# Patient Record
Sex: FEMALE | Race: WHITE | Employment: OTHER | URBAN - METROPOLITAN AREA
[De-identification: names, ages, dates, MRNs, and addresses within clinical notes are randomized per-mention and may not be internally consistent; named-entity substitution may affect disease eponyms.]

---

## 2018-09-06 ENCOUNTER — HOSPITAL ENCOUNTER (OUTPATIENT)
Dept: PHYSICAL THERAPY | Age: 78
Discharge: HOME | End: 2018-09-06
Payer: MEDICARE

## 2018-09-06 PROCEDURE — 97162 PT EVAL MOD COMPLEX 30 MIN: CPT | Performed by: PHYSICAL THERAPIST

## 2018-09-06 PROCEDURE — G8985 CARRY GOAL STATUS: HCPCS | Performed by: PHYSICAL THERAPIST

## 2018-09-06 PROCEDURE — G8984 CARRY CURRENT STATUS: HCPCS | Performed by: PHYSICAL THERAPIST

## 2018-09-06 PROCEDURE — 97110 THERAPEUTIC EXERCISES: CPT | Performed by: PHYSICAL THERAPIST

## 2018-09-06 NOTE — PROGRESS NOTES
PHYSICAL THERAPY - DAILY TREATMENT NOTE Patient Name: Jesus Bee        Date: 2018 : 1940   YES Patient  Verified Visit #:     Insurance: Payor: [place] MEDICARE / Plan: VA MEDICARE PART A & B / Product Type: Medicare / In time: 1:40 Out time: 2:30 Total Treatment Time: 50 Medicare Time Tracking (below) Total Timed Codes (min):  10 1:1 Treatment Time:  10 TREATMENT AREA = H/O shoulder surgery [Z98.890] SUBJECTIVE Pain Level (on 0 to 10 scale):  0  / 10 Medication Changes/New allergies or changes in medical history, any new surgeries or procedures? NO    If yes, update Summary List  
Subjective Functional Status/Changes:  []  No changes reported See eval/POC OBJECTIVE Modalities Rationale:     decrease edema, decrease inflammation and decrease pain to improve patient's ability to prevent soreness 
 min [] Estim, type/location:    
                                 []  att     []  unatt     []  w/US     []  w/ice    []  w/heat  
 min []  Mechanical Traction: type/lbs   
               []  pro   []  sup   []  int   []  cont    []  before manual    []  after manual  
 min []  Ultrasound, settings/location:    
 min []  Iontophoresis w/ dexamethasone, location:   
                                           []  take home patch       []  in clinic  
10 min [x]  Ice     []  Heat    location/position: R shoulder - seated after treatment  
 min []  Vasopneumatic Device, press/temp:   
 min []  Other:   
[] Skin assessment post-treatment (if applicable):   
[]  intact    []  redness- no adverse reaction    
[]redness  adverse reaction:     
 
10 min Therapeutic Exercise:  [x]  See flow sheet Rationale:      increase ROM, increase strength and improve coordination to improve the patients ability to regain functional reach  
 
 min Therapeutic Activity:   
 
 
 min Neuromuscular Re-ed:   
 
 
 min Gait Training:   
 
 min Patient Education:  YES  Reviewed HEP  
 []  Progressed/Changed HEP based on: Other Objective/Functional Measures: FOTO - 47 Post Treatment Pain Level (on 0 to 10) scale:   0  / 10 ASSESSMENT Assessment/Changes in Function:  
 
Pt has ROM loss, weakness, and limited functional use s/p reverse TSA. []  See Progress Note/Recertification Patient will continue to benefit from skilled PT services to modify and progress therapeutic interventions, address functional mobility deficits, address ROM deficits, address strength deficits, analyze and address soft tissue restrictions, analyze and cue movement patterns, analyze and modify body mechanics/ergonomics and assess and modify postural abnormalities to attain remaining goals. Progress toward goals / Updated goals: 
 
Goals established today PLAN [x]  Upgrade activities as tolerated YES Continue plan of care  
[]  Discharge due to :   
[]  Other:   
 
Therapist: Belem Leggett PT Date: 9/6/2018 Time: 1:35 PM  
 
Future Appointments Date Time Provider Stacey Hernández 9/11/2018 1:00 PM Belem Leggett PT [facility]  
9/14/2018 1:00 PM Belem Leggett PT [facility]  
9/17/2018 2:30 PM Belem Leggett, PT [facility]  
9/19/2018 2:00 PM Belem Leggett, PT [facility]  
9/21/2018 1:00 PM Belem Leggett, PT [facility]  
9/24/2018 3:00 PM Belem Leggett PT [facility]  
9/26/2018 2:00 PM Belem Leggett PT [facility]  
9/28/2018 2:00 PM Ayaan [facility]  
10/1/2018 2:00 PM Belem Leggett, PT [facility]  
10/3/2018 2:00 PM Belem Leggett, PT [facility]  
10/5/2018 2:00 PM [facility]

## 2018-09-06 NOTE — PROGRESS NOTES
Tori [address]  [facility] [facility]  [facility] 
[address] ,[address], [address]  Phone: [phone]  Fax: [phone] PLAN OF CARE / STATEMENT OF MEDICAL NECESSITY FOR PHYSICAL THERAPY SERVICES Patient Name: Roe Rios : 1940 Medical  
Diagnosis: H/O shoulder surgery [Z98.890] Treatment Diagnosis: R Shoulder OA Onset Date: 18 Referral Source: Isiah Alexandra MD [facility]): 2018 Prior Hospitalization: See medical history Provider #: [id number] Prior Level of Functioning Limited due of R UE since falling, then tearing her RC in 2018 Comorbidities: H/o prior RSA ~10 yrs ago, B MAGALI, B TKA, polio, thyroid dysfunction, h/o breast cancer Medications: Verified on Patient Summary List  
The Plan of Care and following information is based on the information from the initial evaluation.  
=========================================================================================== Assessment / key information:  78 y/o female presents to PT s/p reverse TSA on 18. She reports a fall last year and subsequent tear of RC when reaching in her car. Pt had severe pain and loss of motion for several months prior to surgery. She wore a sling for 3 weeks, then came out of the sling a week ago. She has been afraid to use her R UE, but does brush her teeth and eat with R UE.  AROM assessment reveals limited ER (0 degrees with arm by side), and elevation to 95 degrees. Pt was educated and could verbally recall precautions to avoid reaching into IR and across her body. She would benefit from PT to increase AA->AROM of right UE to maxmize functional return/use of R (dominant) UE. 
=========================================================================================== Eval Complexity: History HIGH Complexity :3+ comorbidities / personal factors will impact the outcome/ POC ;  Examination  MEDIUM Complexity : 3 Standardized tests and measures addressing body structure, function, activity limitation and / or participation in recreation ; Presentation MEDIUM Complexity : Evolving with changing characteristics ; Decision Making MEDIUM Complexity : FOTO score of 26-74; Overall Complexity MEDIUM Problem List: pain affecting function, decrease ROM, decrease strength, decrease ADL/ functional abilitiies, decrease activity tolerance and decrease flexibility/ joint mobility Treatment Plan may include any combination of the following: Therapeutic exercise, Therapeutic activities, Neuromuscular re-education, Physical agent/modality, Gait/balance training, Manual therapy, Dry Needling, Aquatic therapy, Patient education, Self Care training, Functional mobility training, Home safety training and Stair training Patient / Family readiness to learn indicated by: asking questions, trying to perform skills and interestPersons(s) to be included in education: patient (P) Barriers to Learning/Limitations: None Measures taken:   
Patient Goal (s): \"better motion and strength\" Patient self reported health status: fair Rehabilitation Potential: good? Short Term Goals: To be accomplished in  2  weeks: 1. Pt independent with HEP for AA-AROM into elevation. 2. Pt to report increased use of right UE for light ADLs without pain. ? Long Term Goals: To be accomplished in  6  weeks: 1. Pt to increase FOTO score from 54 to >/= 67. 
2. Pt independent with high level HEP for right shoddler AROM/gentle strengthening. 3. Pt to report ability to use right UE for feeding her dependent  without symptoms. 4. Pt able to reach actively to 120 degrees to allow use of right in kitchen without symptoms. Frequency / Duration:   Patient to be seen  2-3  times per week for 6  weeks: 
Patient / Caregiver education and instruction: self care, activity modification and exercises G-Codes (GP): Carry [id number] Current  CK= 40-59%  Goal  CJ= 20-39%. The severity rating is based on the FOTO Score Therapist Signature: Marta Rojas PT Date: 9/6/2018 Certification Period: 9/6/18 - 12/6/18 Time: 1:36 PM  
=========================================================================================== I certify that the above Physical Therapy Services are being furnished while the patient is under my care. I agree with the treatment plan and certify that this therapy is necessary. Physician Signature:       Date:      Time:  Please sign and return to [facility] or you may fax the signed copy to [phone]. Thank you.

## 2018-09-11 ENCOUNTER — HOSPITAL ENCOUNTER (OUTPATIENT)
Dept: PHYSICAL THERAPY | Age: 78
Discharge: HOME | End: 2018-09-11
Payer: MEDICARE

## 2018-09-11 PROCEDURE — 97110 THERAPEUTIC EXERCISES: CPT | Performed by: PHYSICAL THERAPIST

## 2018-09-11 NOTE — PROGRESS NOTES
PHYSICAL THERAPY - DAILY TREATMENT NOTE Patient Name: Lopez Shannon        Date: 2018 : 1940   YES Patient  Verified Visit #:     Insurance: Payor: VA MEDICARE / Plan: VA MEDICARE PART A & B / Product Type: Medicare / In time: 1:00 Out time: 1:45 Total Treatment Time: 40 Medicare Time Tracking (below) Total Timed Codes (min):  30 1:1 Treatment Time:  30 TREATMENT AREA = H/O shoulder surgery [Z98.890] SUBJECTIVE Pain Level (on 0 to 10 scale):  0  / 10 Medication Changes/New allergies or changes in medical history, any new surgeries or procedures? NO    If yes, update Summary List  
Subjective Functional Status/Changes:  []  No changes reported Pt denies having any soreness after initial sesison. OBJECTIVE Modalities Rationale:     decrease edema, decrease inflammation and decrease pain to improve patient's ability to prevent soreness 
 min [] Estim, type/location:    
                                 []  att     []  unatt     []  w/US     []  w/ice    []  w/heat  
 min []  Mechanical Traction: type/lbs   
               []  pro   []  sup   []  int   []  cont    []  before manual    []  after manual  
 min []  Ultrasound, settings/location:    
 min []  Iontophoresis w/ dexamethasone, location:   
                                           []  take home patch       []  in clinic  
10 min [x]  Ice     []  Heat    location/position: R shoulder - seated after treatment  
 min []  Vasopneumatic Device, press/temp:   
 min []  Other:   
[] Skin assessment post-treatment (if applicable):   
[]  intact    []  redness- no adverse reaction    
[]redness  adverse reaction:     
 
30 min Therapeutic Exercise:  [x]  See flow sheet Rationale:      increase ROM, increase strength and improve coordination to improve the patients ability to regain functional reach  
 
 
 min Therapeutic Activity:   
 
 
 min Neuromuscular Re-ed:   
 
 
 min Gait Training: min Patient Education:  YES  Reviewed HEP []  Progressed/Changed HEP based on: Other Objective/Functional Measures: Added ball rolling to 90 degrees, then ER -> lift off beyond 90 Post Treatment Pain Level (on 0 to 10) scale:   0  / 10 ASSESSMENT Assessment/Changes in Function:  
 
Pt showed improved movement patterns ans exercise tolerance to day without pain. She did use compensatory scapular elevation when she started to fatigue. []  See Progress Note/Recertification Patient will continue to benefit from skilled PT services to modify and progress therapeutic interventions, address functional mobility deficits, address ROM deficits, address strength deficits, analyze and address soft tissue restrictions, analyze and cue movement patterns, analyze and modify body mechanics/ergonomics and assess and modify postural abnormalities to attain remaining goals. Progress toward goals / Updated goals: Will continue to progress therex per protocol as tolerated. PLAN [x]  Upgrade activities as tolerated YES Continue plan of care  
[]  Discharge due to :   
[]  Other:   
 
Therapist: Alfonso Porter PT Date: 9/11/2018 Time: 9:11 AM  
 
Future Appointments Date Time Provider Stacey Hernández 9/11/2018 1:00 PM Alfonso Porter [place] [facility]  
9/14/2018 1:00 PM Alfonso Porter [place] [facility]  
9/17/2018 2:30 PM Alfonso Porter PT [facility]  
9/19/2018 2:00 PM Alfonso Porter PT [facility]  
9/21/2018 1:00 PM Alfonso Porter PT [facility]  
9/24/2018 3:00 PM Alfonso Porter PT [facility]  
9/26/2018 2:00 PM Alfonso Porter PT [facility]  
9/28/2018 2:00 PM Eva Johnson [facility]  
10/1/2018 2:00 PM Alfonso Porter PT [facility]  
10/3/2018 2:00 PM Alfonso Porter PT [facility]  
10/5/2018 2:00 PM Eva Johnson [facility]

## 2018-09-14 ENCOUNTER — APPOINTMENT (OUTPATIENT)
Dept: PHYSICAL THERAPY | Age: 78
End: 2018-09-14
Payer: MEDICARE

## 2018-09-17 ENCOUNTER — HOSPITAL ENCOUNTER (OUTPATIENT)
Dept: PHYSICAL THERAPY | Age: 78
Discharge: HOME | End: 2018-09-17
Payer: MEDICARE

## 2018-09-17 PROCEDURE — 97110 THERAPEUTIC EXERCISES: CPT | Performed by: PHYSICAL THERAPIST

## 2018-09-17 NOTE — PROGRESS NOTES
PHYSICAL THERAPY - DAILY TREATMENT NOTE Patient Name: Kristi Pisano        Date: 2018 : 1940   YES Patient  Verified Visit #:   3   of   18  Insurance: Payor: VA MEDICARE / Plan: VA MEDICARE PART A & B / Product Type: Medicare / In time: 2:30 Out time: 3:10 Total Treatment Time: 40 Medicare Time Tracking (below) Total Timed Codes (min):  25 1:1 Treatment Time:  25 TREATMENT AREA = H/O shoulder surgery [Z98.890] SUBJECTIVE Pain Level (on 0 to 10 scale):  0  / 10 Medication Changes/New allergies or changes in medical history, any new surgeries or procedures? NO    If yes, update Summary List  
Subjective Functional Status/Changes:  []  No changes reported Pt reports using right UE for ADLs, just following doctor instructions to avoid reaching behind back. OBJECTIVE Modalities Rationale:     decrease edema, decrease inflammation and decrease pain to improve patient's ability to prevent soreness 
 min [] Estim, type/location:    
                                 []  att     []  unatt     []  w/US     []  w/ice    []  w/heat  
 min []  Mechanical Traction: type/lbs   
               []  pro   []  sup   []  int   []  cont    []  before manual    []  after manual  
 min []  Ultrasound, settings/location:    
 min []  Iontophoresis w/ dexamethasone, location:   
                                           []  take home patch       []  in clinic  
10 min [x]  Ice     []  Heat    location/position: R shoulder - seated after treatment  
 min []  Vasopneumatic Device, press/temp:   
 min []  Other:   
[] Skin assessment post-treatment (if applicable):   
[]  intact    []  redness- no adverse reaction    
[]redness  adverse reaction:     
 
30(25) min Therapeutic Exercise:  [x]  See flow sheet Rationale:      increase ROM, increase strength and improve coordination to improve the patients ability to regain functional reach w R UE  
 
 
 min Therapeutic Activity: min Neuromuscular Re-ed:   
 
 
 min Gait Training:   
 
 min Patient Education:  Mauri Alba []  Progressed/Changed HEP based on: Other Objective/Functional Measures: Added T-band row, bicep curls and active elevation with straight arms against gravity Post Treatment Pain Level (on 0 to 10) scale:   0  / 10 ASSESSMENT Assessment/Changes in Function:  
 
Pt fatigued quickly with addition of new exercises. []  See Progress Note/Recertification Patient will continue to benefit from skilled PT services to modify and progress therapeutic interventions, address functional mobility deficits, address ROM deficits, address strength deficits, analyze and address soft tissue restrictions, analyze and cue movement patterns, analyze and modify body mechanics/ergonomics and assess and modify postural abnormalities to attain remaining goals. Progress toward goals / Updated goals: Will continue with gradual progression of right shoulder strength/endurance per protocol. PLAN [x]  Upgrade activities as tolerated YES Continue plan of care  
[]  Discharge due to :   
[]  Other:   
 
Therapist: Rush Muñoz PT Date: 9/17/2018 Time: 10:34 AM  
 
Future Appointments Date Time Provider Stacey Hernández 9/17/2018 2:30 PM Brena Lung, PT [facility]  
9/19/2018 2:00 PM Brena Lung, PT [facility]  
9/21/2018 1:00 PM Brena Lung, PT [facility]  
9/24/2018 3:00 PM Brena Lung, PT [facility]  
9/26/2018 2:00 PM Brena Lung, PT [facility]  
9/28/2018 2:00 PM Sameera December [facility]  
10/1/2018 2:00 PM Brena Lung, PT [facility]  
10/3/2018 2:00 PM Brena Lung, PT [facility]  
10/5/2018 2:00 PM Sameera [facility]

## 2018-09-19 ENCOUNTER — HOSPITAL ENCOUNTER (OUTPATIENT)
Dept: PHYSICAL THERAPY | Age: 78
Discharge: HOME | End: 2018-09-19
Payer: MEDICARE

## 2018-09-19 PROCEDURE — 97110 THERAPEUTIC EXERCISES: CPT | Performed by: PHYSICAL THERAPIST

## 2018-09-19 NOTE — PROGRESS NOTES
PHYSICAL THERAPY - DAILY TREATMENT NOTE Patient Name: Jesus Bee        Date: 2018 : 1940   YES Patient  Verified Visit #:     Insurance: Payor: Monty Inman / Plan: VA MEDICARE PART A & B / Product Type: Medicare / In time: 1:50 Out time: 2:45 Total Treatment Time: 54 Medicare Time Tracking (below) Total Timed Codes (min):  45 1:1 Treatment Time:  45 TREATMENT AREA = H/O shoulder surgery [Z98.890] SUBJECTIVE Pain Level (on 0 to 10 scale):  0  / 10 Medication Changes/New allergies or changes in medical history, any new surgeries or procedures? NO    If yes, update Summary List  
Subjective Functional Status/Changes:  []  No changes reported Pt reports minimal symptoms into shoulder. She has been using it for light tasks below shoulder level. No pain/soreness after last PT session. OBJECTIVE Modalities Rationale:     decrease edema, decrease inflammation and decrease pain to improve patient's ability to prevent soreness 
 min [] Estim, type/location:    
                                 []  att     []  unatt     []  w/US     []  w/ice    []  w/heat  
 min []  Mechanical Traction: type/lbs   
               []  pro   []  sup   []  int   []  cont    []  before manual    []  after manual  
 min []  Ultrasound, settings/location:    
 min []  Iontophoresis w/ dexamethasone, location:   
                                           []  take home patch       []  in clinic  
10 min [x]  Ice     []  Heat    location/position: R shoulder - seated after treatment  
 min []  Vasopneumatic Device, press/temp:   
 min []  Other:   
[] Skin assessment post-treatment (if applicable):   
[]  intact    []  redness- no adverse reaction    
[]redness  adverse reaction:     
 
45 min Therapeutic Exercise:  [x]  See flow sheet Rationale:      increase ROM, increase strength and improve coordination to improve the patients ability to regain functional reach min Therapeutic Activity:   
 
 
 min Neuromuscular Re-ed:   
 
 
 min Gait Training:   
 
 min Patient Education:  YES  Reviewed HEP []  Progressed/Changed HEP based on: Other Objective/Functional Measures: 
 
Able to reach to level 21 on wall ladder by last rep Had pt perform 2nd set of AAROM with pulleys in standing to allow straight elbow and minimize effects of ER/IR Post Treatment Pain Level (on 0 to 10) scale:   0  / 10 ASSESSMENT Assessment/Changes in Function:  
 
Added supine scapular protraction and supine right shoulder arm circles clockwise/counter-clockwise 2 sets of 30s. Pt has less fatigue by end of today's session. []  See Progress Note/Recertification Patient will continue to benefit from skilled PT services to modify and progress therapeutic interventions, address functional mobility deficits, address ROM deficits, address strength deficits, analyze and address soft tissue restrictions, analyze and cue movement patterns, analyze and modify body mechanics/ergonomics and assess and modify postural abnormalities to attain remaining goals. Progress toward goals / Updated goals: Will continue to progress therex to increase right shoulder strength/endurance PLAN [x]  Upgrade activities as tolerated YES Continue plan of care  
[]  Discharge due to :   
[]  Other:   
 
Therapist: Kylie Wells PT Date: 9/19/2018 Time: 10:49 AM  
 
Future Appointments Date Time Provider Stacey Hernández 9/19/2018 2:00 PM Kylie Wells [facility]  
9/21/2018 9:30 AM Kylie Wells [facility]  
9/24/2018 3:00 PM Kylie Wells PT [facility]  
9/26/2018 2:00 PM Kylie Wells PT [facility]  
9/28/2018 2:00 PM Lorena Ped [facility]  
10/1/2018 2:00 PM Kylie Wells PT [facility]  
10/3/2018 2:00 PM Kylie Wells PT [facility]  
10/5/2018 2:00 PM Lorena Ped [facility]

## 2018-09-21 ENCOUNTER — HOSPITAL ENCOUNTER (OUTPATIENT)
Dept: PHYSICAL THERAPY | Age: 78
Discharge: HOME | End: 2018-09-21
Payer: MEDICARE

## 2018-09-21 PROCEDURE — 97110 THERAPEUTIC EXERCISES: CPT | Performed by: PHYSICAL THERAPIST

## 2018-09-21 NOTE — PROGRESS NOTES
PHYSICAL THERAPY - DAILY TREATMENT NOTE Patient Name: Arianna Shah        Date: 2018 : 1940   YES Patient  Verified Visit #:      18  Insurance: Payor: Paula Cantor / Plan: Kelvin Alvarenga / Product Type: Medicare / In time: 9:30 Out time: 10:25 Total Treatment Time: 54 Medicare Time Tracking (below) Total Timed Codes (min):  40 1:1 Treatment Time:  40 TREATMENT AREA = H/O shoulder surgery [Z98.890] SUBJECTIVE Pain Level (on 0 to 10 scale):  0  / 10 Medication Changes/New allergies or changes in medical history, any new surgeries or procedures? NO    If yes, update Summary List  
Subjective Functional Status/Changes:  []  No changes reported Pt reports fatigue after PT only lasts a short period, then resolves. No residual soreness. OBJECTIVE Modalities Rationale:     decrease edema, decrease inflammation and decrease pain to improve patient's ability to prevent soreness 
 min [] Estim, type/location:    
                                 []  att     []  unatt     []  w/US     []  w/ice    []  w/heat  
 min []  Mechanical Traction: type/lbs   
               []  pro   []  sup   []  int   []  cont    []  before manual    []  after manual  
 min []  Ultrasound, settings/location:    
 min []  Iontophoresis w/ dexamethasone, location:   
                                           []  take home patch       []  in clinic  
10 min [x]  Ice     []  Heat    location/position: R shoudler - seated after treatment  
 min []  Vasopneumatic Device, press/temp:   
 min []  Other:   
[] Skin assessment post-treatment (if applicable):   
[]  intact    []  redness- no adverse reaction    
[]redness  adverse reaction:     
 
45(40) min Therapeutic Exercise:  [x]  See flow sheet Rationale:      increase ROM, increase strength and improve coordination to improve the patients ability to increase functional reach  
 
 
 min Therapeutic Activity: min Neuromuscular Re-ed:   
 
 
 min Gait Training:   
 
 min Patient Education:  Luc Mcneill []  Progressed/Changed HEP based on: Other Objective/Functional Measures: 
 
Increased reps with several exercises today Post Treatment Pain Level (on 0 to 10) scale:   0  / 10 ASSESSMENT Assessment/Changes in Function:  
 
Pt showing improved strength, but endurance remains limited. []  See Progress Note/Recertification Patient will continue to benefit from skilled PT services to modify and progress therapeutic interventions, address functional mobility deficits, address ROM deficits, address strength deficits, analyze and address soft tissue restrictions, analyze and cue movement patterns, analyze and modify body mechanics/ergonomics and assess and modify postural abnormalities to attain remaining goals. Progress toward goals / Updated goals: Will continue to emphaize regaining strength/endurance into elevation to achieve LTGs. PLAN [x]  Upgrade activities as tolerated YES Continue plan of care  
[]  Discharge due to :   
[]  Other:   
 
Therapist: Mary Velazquez PT Date: 9/21/2018 Time: 9:40 AM  
 
Future Appointments Date Time Provider Stacey Hernández 9/24/2018 3:00 PM Mary Velazquez PT [facility] [place]  
9/26/2018 2:00 PM Mary Velazquez PT [facility]  
9/28/2018 2:00  [facility]  
10/1/2018 2:00 PM Mary Velazquez PT [facility]  
10/3/2018 2:00 PM Mary Velazquez PT [facility]  
10/5/2018 2:00  [facility]

## 2018-09-24 ENCOUNTER — HOSPITAL ENCOUNTER (OUTPATIENT)
Dept: PHYSICAL THERAPY | Age: 78
Discharge: HOME | End: 2018-09-24
Payer: MEDICARE

## 2018-09-24 PROCEDURE — 97110 THERAPEUTIC EXERCISES: CPT | Performed by: PHYSICAL THERAPIST

## 2018-09-24 NOTE — PROGRESS NOTES
PHYSICAL THERAPY - DAILY TREATMENT NOTE Patient Name: Amador Knight        Date: 2018 : 1940   YES Patient  Verified Visit #:     Insurance: Payor: Dianne Awan / Plan: Kelvin Vasquezy / Product Type: Medicare / In time: 2:55 Out time: 3:50 Total Treatment Time: 54 Medicare Time Tracking (below) Total Timed Codes (min):  40 1:1 Treatment Time:  40 TREATMENT AREA = H/O shoulder surgery [Z98.890] SUBJECTIVE Pain Level (on 0 to 10 scale):  0  / 10 Medication Changes/New allergies or changes in medical history, any new surgeries or procedures? NO    If yes, update Summary List  
Subjective Functional Status/Changes:  []  No changes reported Pt reports able to use R UE to feed  the past 2 days. She has to stand to allow mechanics advantage at times, but notes being able to do more each day. OBJECTIVE Modalities Rationale:     decrease edema, decrease inflammation and decrease pain to improve patient's ability to prevent soreness 
 min [] Estim, type/location:    
                                 []  att     []  unatt     []  w/US     []  w/ice    []  w/heat  
 min []  Mechanical Traction: type/lbs   
               []  pro   []  sup   []  int   []  cont    []  before manual    []  after manual  
 min []  Ultrasound, settings/location:    
 min []  Iontophoresis w/ dexamethasone, location:   
                                           []  take home patch       []  in clinic  
10 min [x]  Ice     []  Heat    location/position: R shoudler - seated after treatment  
 min []  Vasopneumatic Device, press/temp:   
 min []  Other:   
[] Skin assessment post-treatment (if applicable):   
[]  intact    []  redness- no adverse reaction    
[]redness  adverse reaction:     
 
40 min Therapeutic Exercise:  [x]  See flow sheet Rationale:      increase ROM, increase strength and improve coordination to improve the patients ability to increase functional reach  
 
 
 min Therapeutic Activity:   
 
 
 min Neuromuscular Re-ed:   
 
 
 min Gait Training:   
 
 min Patient Education:  Erik Montiel []  Progressed/Changed HEP based on: Other Objective/Functional Measures: Added AROM into salute today for 10 reps Post Treatment Pain Level (on 0 to 10) scale:   0  / 10 ASSESSMENT Assessment/Changes in Function:  
 
Pt able to complete therex today with less fatigue than prior sessions. AROM into elevation remains the most difficult exercise for pt. 
  
[]  See Progress Note/Recertification Patient will continue to benefit from skilled PT services to modify and progress therapeutic interventions, address functional mobility deficits, address ROM deficits, address strength deficits, analyze and address soft tissue restrictions, analyze and cue movement patterns, analyze and modify body mechanics/ergonomics and assess and modify postural abnormalities to attain remaining goals. Progress toward goals / Updated goals: Will gradually increase reps as tolerated. PLAN [x]  Upgrade activities as tolerated YES Continue plan of care  
[]  Discharge due to :   
[]  Other:   
 
Therapist: Madalyn Kirk PT Date: 9/24/2018 Time: 11:15 AM  
 
Future Appointments Date Time Provider Stacey Hernández 9/24/2018 3:00 PM Madalyn Kirk PT [facility]  
9/26/2018 2:00 PM Madalyn Kirk PT [facility]  
9/28/2018 2:00  [facility]  
10/1/2018 2:00 PM Madalyn Kirk PT [facility]  
10/3/2018 2:00 PM Madalyn Kirk PT [facility]  
10/5/2018 2:00  [facility]

## 2018-09-26 ENCOUNTER — HOSPITAL ENCOUNTER (OUTPATIENT)
Dept: PHYSICAL THERAPY | Age: 78
Discharge: HOME | End: 2018-09-26
Payer: MEDICARE

## 2018-09-26 PROCEDURE — 97110 THERAPEUTIC EXERCISES: CPT | Performed by: PHYSICAL THERAPIST

## 2018-09-26 NOTE — PROGRESS NOTES
PHYSICAL THERAPY - DAILY TREATMENT NOTE Patient Name: Brenda Mosquera        Date: 2018 : 1940   YES Patient  Verified Visit #:     Insurance: Payor: Yi Hawley / Plan: Kelvin Alvarenga / Product Type: Medicare / In time: 2:00 Out time: 2:55 Total Treatment Time: 50 Medicare Time Tracking (below) Total Timed Codes (min):  40 1:1 Treatment Time:  40 TREATMENT AREA = H/O shoulder surgery [Z98.890] SUBJECTIVE Pain Level (on 0 to 10 scale):  0  / 10 Medication Changes/New allergies or changes in medical history, any new surgeries or procedures? NO    If yes, update Summary List  
Subjective Functional Status/Changes:  []  No changes reported Pt reports using right UE to feed her  at the [facility] today. She notes it took a long time, but she was nilesh to remain seated and use R UE. Her R shoudler is now tired. OBJECTIVE Modalities Rationale:     decrease edema, decrease inflammation and decrease pain to improve patient's ability to prevent soreness 
 min [] Estim, type/location:    
                                 []  att     []  unatt     []  w/US     []  w/ice    []  w/heat  
 min []  Mechanical Traction: type/lbs   
               []  pro   []  sup   []  int   []  cont    []  before manual    []  after manual  
 min []  Ultrasound, settings/location:    
 min []  Iontophoresis w/ dexamethasone, location:   
                                           []  take home patch       []  in clinic  
10 min [x]  Ice     []  Heat    location/position: R shoulder - seated after treatment  
 min []  Vasopneumatic Device, press/temp:   
 min []  Other:   
[] Skin assessment post-treatment (if applicable):   
[]  intact    []  redness- no adverse reaction    
[]redness  adverse reaction:     
 
40 min Therapeutic Exercise:  [x]  See flow sheet Rationale:      increase ROM, increase strength and improve coordination to improve the patients ability to regain functional reach with R UE  
 
 
 min Therapeutic Activity:   
 
 
 min Neuromuscular Re-ed:   
 
 
 min Gait Training:   
 
 min Patient Education:  YES  Reviewed HEP []  Progressed/Changed HEP based on: Other Objective/Functional Measures: 
 
Repeated same program as last session Post Treatment Pain Level (on 0 to 10) scale:   0  / 10 ASSESSMENT Assessment/Changes in Function: Pt was more fatigued today, and did not reach as high into elevation, but she was able to perform all reps without symptoms. []  See Progress Note/Recertification Patient will continue to benefit from skilled PT services to modify and progress therapeutic interventions, address functional mobility deficits, address ROM deficits, address strength deficits, analyze and address soft tissue restrictions, analyze and cue movement patterns, analyze and modify body mechanics/ergonomics and assess and modify postural abnormalities to attain remaining goals. Progress toward goals / Updated goals: Will continue to progress AROM and endurance of right UE into elevation. PLAN [x]  Upgrade activities as tolerated YES Continue plan of care  
[]  Discharge due to :   
[]  Other:   
 
Therapist: Ratna eRyes PT Date: 9/26/2018 Time: 10:27 AM  
 
Future Appointments Date Time Provider Stacey Hernández 9/26/2018 2:00 PM Ratna Reyes PT [facility]  
9/28/2018 2:00 PM Nathan Szymanski [facility]  
10/1/2018 2:00 PM Ratna Reyes PT [facility]  
10/3/2018 2:00 PM Ratna Reyes PT [facility]  
10/5/2018 2:00 PM Nathan Szymanski [facility]

## 2018-09-28 ENCOUNTER — HOSPITAL ENCOUNTER (OUTPATIENT)
Dept: PHYSICAL THERAPY | Age: 78
Discharge: HOME | End: 2018-09-28
Payer: MEDICARE

## 2018-09-28 PROCEDURE — 97110 THERAPEUTIC EXERCISES: CPT

## 2018-09-28 NOTE — PROGRESS NOTES
PHYSICAL THERAPY - DAILY TREATMENT NOTE Patient Name: Carey Hall        Date: 2018 : 1940   YES Patient  Verified Visit #:     Insurance: Payor: Adore Render / Plan: Kelvin Vasquezy / Product Type: Medicare / In time: 1:57 PM Out time: 2:47 PM  
Total Treatment Time: 50 Medicare Time Tracking (below) Total Timed Codes (min):  40 1:1 Treatment Time:  40 TREATMENT AREA = H/O shoulder surgery [Z98.890] SUBJECTIVE Pain Level (on 0 to 10 scale):  0  / 10 Medication Changes/New allergies or changes in medical history, any new surgeries or procedures? NO    If yes, update Summary List  
Subjective Functional Status/Changes:  []  No changes reported \"I just got done feeding my . It took a little bit longer to feed him today, but It wasn't too bad today. I'm just a little bit more tired. \" OBJECTIVE Modalities Rationale:     decrease edema, decrease inflammation and decrease pain to improve patient's ability to perform pain-free functional ADLs 
 min [] Estim, type/location:    
                                 []  att     []  unatt     []  w/US     []  w/ice    []  w/heat  
 min []  Mechanical Traction: type/lbs   
               []  pro   []  sup   []  int   []  cont    []  before manual    []  after manual  
 min []  Ultrasound, settings/location:    
 min []  Iontophoresis w/ dexamethasone, location:   
                                           []  take home patch       []  in clinic  
10 min [x]  Ice     []  Heat    location/position: Seated to R shoulder post-session  
 min []  Vasopneumatic Device, press/temp:   
 min []  Other:   
[] Skin assessment post-treatment (if applicable):   
[]  intact    []  redness- no adverse reaction    
[]redness  adverse reaction:     
 
40 min Therapeutic Exercise:  [x]  See flow sheet Rationale:      increase ROM, increase strength and improve coordination to improve the patients ability to regain functional, pain free functional mobility with RUE for OH/reaching activities  
 
 min Therapeutic Activity:   
 
 
 min Neuromuscular Re-ed:   
 
 
 min Gait Training:   
 
 min Patient Education:  YES  Reviewed HEP []  Progressed/Changed HEP based on: Other Objective/Functional Measures: 
 
1:1 TE = 40' Increased repetitions with biceps curls Post Treatment Pain Level (on 0 to 10) scale:   0  / 10 ASSESSMENT Assessment/Changes in Function:  
 
Continues to demonstrate good elevation during therex, however noted ms fatigue. Denies sharp pain or red flags during PT session. []  See Progress Note/Recertification Patient will continue to benefit from skilled PT services to modify and progress therapeutic interventions, address functional mobility deficits, address ROM deficits, address strength deficits, analyze and address soft tissue restrictions, analyze and cue movement patterns, analyze and modify body mechanics/ergonomics and assess and modify postural abnormalities to attain remaining goals. Progress toward goals / Updated goals: 
 
Good progress towards LTG #3 Will continue to progress therex per patient's tolerance PLAN [x]  Upgrade activities as tolerated YES Continue plan of care  
[]  Discharge due to :   
[]  Other:   
 
Therapist: DESHAWN Dailey Date: 9/28/2018 Time: 3:06 PM  
 
Future Appointments Date Time Provider Stacey Hernández 10/1/2018 2:00 PM Hernandez Newman PT [facility]  
10/3/2018 2:00 PM Hernandez Newman PT [facility]  
10/5/2018 2:00 PM Maranda Stephen [facility]

## 2018-10-01 ENCOUNTER — HOSPITAL ENCOUNTER (OUTPATIENT)
Dept: PHYSICAL THERAPY | Age: 78
Discharge: HOME | End: 2018-10-01
Payer: MEDICARE

## 2018-10-01 PROCEDURE — 97110 THERAPEUTIC EXERCISES: CPT | Performed by: PHYSICAL THERAPIST

## 2018-10-01 PROCEDURE — G8985 CARRY GOAL STATUS: HCPCS | Performed by: PHYSICAL THERAPIST

## 2018-10-01 PROCEDURE — G8984 CARRY CURRENT STATUS: HCPCS | Performed by: PHYSICAL THERAPIST

## 2018-10-01 NOTE — PROGRESS NOTES
PHYSICAL THERAPY - DAILY TREATMENT NOTE Patient Name: Otilia Mazariegos        Date: 10/1/2018 : 1940   YES Patient  Verified Visit #:     Insurance: Payor: Zackery Omedixs / Plan: Kelvin Vasquezy / Product Type: Medicare / In time: 2:00 Out time: 2:50 Total Treatment Time: 40 Medicare Time Tracking (below) Total Timed Codes (min):  40 1:1 Treatment Time:  40 TREATMENT AREA = H/O shoulder surgery [Z98.890] SUBJECTIVE Pain Level (on 0 to 10 scale):  0  / 10 Medication Changes/New allergies or changes in medical history, any new surgeries or procedures? NO    If yes, update Summary List  
Subjective Functional Status/Changes:  []  No changes reported Pt reports able to feed her  more easily today, and notes each day gets a little easier. OBJECTIVE Modalities Rationale:     decrease edema, decrease inflammation and decrease pain to improve patient's ability to regain functional reach 
 min [] Estim, type/location:   
                                 []  att     []  unatt     []  w/US     []  w/ice    []  w/heat 
 min []  Mechanical Traction: type/lbs   
               []  pro   []  sup   []  int   []  cont    []  before manual    []  after manual  
 min []  Ultrasound, settings/location:    
 min []  Iontophoresis w/ dexamethasone, location:   
                                           []  take home patch       []  in clinic  
10 min [x]  Ice     []  Heat    location/position: R shoulder - seated after treatment  
 min []  Vasopneumatic Device, press/temp:   
 min []  Other:   
[] Skin assessment post-treatment (if applicable):   
[]  intact    []  redness- no adverse reaction    
[]redness  adverse reaction:     
40 min Therapeutic Exercise:  [x]  See flow sheet Rationale:      increase ROM, increase strength and improve coordination to improve the patients ability to regain functional reach  
 min Therapeutic Activity: min Neuromuscular Re-ed:   
 
 min Gait Training:   
 min Patient Education:  Justina Oneill []  Progressed/Changed HEP based on: Other Objective/Functional Measures: FOTO - 47 Post Treatment Pain Level (on 0 to 10) scale:   0  / 10 ASSESSMENT Assessment/Changes in Function: Pt continues to show increased ROM and endurance with elevation of R UE. [x]  See Progress Note Patient will continue to benefit from skilled PT services to modify and progress therapeutic interventions, address functional mobility deficits, address ROM deficits, address strength deficits, analyze and address soft tissue restrictions, analyze and cue movement patterns, analyze and modify body mechanics/ergonomics and assess and modify postural abnormalities to attain remaining goals. Progress toward goals / Updated goals: Will continue to progress AROM/ednurance of R UE. PLAN [x]  Upgrade activities as tolerated YES Continue plan of care  
[]  Discharge due to :   
[]  Other:   
 
Therapist: Estuardo Covarrubias PT Date: 10/1/2018 Time: 10:46 AM  
 
Future Appointments Date Time Provider Stacey Hernández 10/1/2018 2:00 PM Estuardo Covarrubias PT [facility]  
10/3/2018 2:00 PM Estuardo Covarrubias PT [facility]  
10/5/2018 2:00 PM Marlen Marques [facility]

## 2018-10-01 NOTE — PROGRESS NOTES
Tori Ashkihattie Fry 30  [facility] [address] [place], [place] [address]kristin Huggins \[facility]\"" 08, [address] [place] [address], [address]  Phone: [phone]  Fax: [phone] PROGRESS NOTE Patient Name: Melvi Carey : 1940 Treatment/Medical Diagnosis: H/O shoulder surgery [Z98.890] Referral Source: Allie Márquez [place] Date of Initial Visit: 18 Attended Visits: 9 Missed Visits: 0  
SUMMARY OF TREATMENT 
R shoulder P->AAROM exercises, gentle strengthening, and cold packs for symptom relief. CURRENT STATUS Pt was last seen on 10/1/18, and denied having any pain in her arm. She reported the ability to sit at table and feed her  at the assisted living facility without having to stand up for the first time today. She is able to use right UE more, but fatigues with repetitive elevation. Pt can actively Goal/Measure of Progress Goal Met? 1.  Pt to increase FOTO score from 54 to >/= 67.  
Status at last Eval: 54 Current Status: 54 no 2. Pt to report increased use of right UE for light ADLs without pain. Status at last Eval: - Current Status: Met yes 3. Pt to report ability to use right UE for feeding her dependent  without symptoms. Status at last Eval: - Current Status: Performed once Progressing 4. Pt able to reach actively to 120 degrees to allow use of right in kitchen without symptoms. Status at last Eval: 95 degrees Current Status: 105 degrees Progressing G-Codes (GP): Carry [id number] Current  CK= 40-59%  Goal  CJ= 20-39%. The severity rating is based on the FOTO Score RECOMMENDATIONS Continue PT BIW for an additional 4 weeks, to maximize functional AROM and achieved unmet goals. If you have any questions/comments please contact us directly at [phone]. Thank you for allowing us to assist in the care of your patient. Therapist Signature: Mague Osman PT Date: 10/1/2018 Reporting Period: 18 - 10/1/18 Time: 2:25 PM  
 NOTE TO PHYSICIAN:  PLEASE COMPLETE THE ORDERS BELOW AND FAX TO [facility]: [phone]. If you are unable to process this request in 24 hours please contact our office: [phone]. 
 
___ I have read the above report and request that my patient continue as recommended.  
___ I have read the above report and request that my patient continue therapy with the following changes/special instructions:_________________________________________________________  
___ I have read the above report and request that my patient be discharged from therapy.   
 
Physician Signature:       Date:      Time:

## 2018-10-03 ENCOUNTER — HOSPITAL ENCOUNTER (OUTPATIENT)
Dept: PHYSICAL THERAPY | Age: 78
Discharge: HOME | End: 2018-10-03
Payer: MEDICARE

## 2018-10-03 PROCEDURE — 97110 THERAPEUTIC EXERCISES: CPT | Performed by: PHYSICAL THERAPIST

## 2018-10-03 NOTE — PROGRESS NOTES
PHYSICAL THERAPY - DAILY TREATMENT NOTE Patient Name: Melvi Carey        Date: 10/3/2018 : 1940   YES Patient  Verified Visit #:   10   of   18  Insurance: Payor: Ele Friedman / Plan: Kelvin Vasquezy / Product Type: Medicare / In time: 2:00 Out time: 2:50 Total Treatment Time: 50 Medicare Time Tracking (below) Total Timed Codes (min):  25 1:1 Treatment Time:  25 TREATMENT AREA = H/O shoulder surgery [Z98.890] SUBJECTIVE Pain Level (on 0 to 10 scale):  0  / 10 Medication Changes/New allergies or changes in medical history, any new surgeries or procedures? NO    If yes, update Summary List  
Subjective Functional Status/Changes:  []  No changes reported Pt reports seeing Dr Mary Galeano and will continue PT BIW for another month, then follow-up with him a month after that. OBJECTIVE Modalities Rationale:     decrease edema, decrease inflammation and decrease pain to improve patient's ability to regain functional reach/endurance 
 min [] Estim, type/location:   
                                 []  att     []  unatt     []  w/US     []  w/ice    []  w/heat 
 min []  Mechanical Traction: type/lbs   
               []  pro   []  sup   []  int   []  cont    []  before manual    []  after manual  
 min []  Ultrasound, settings/location:    
 min []  Iontophoresis w/ dexamethasone, location:   
                                           []  take home patch       []  in clinic  
10 min [x]  Ice     []  Heat    location/position: R shoulder - seated after treatment  
 min []  Vasopneumatic Device, press/temp:   
 min []  Other:   
[] Skin assessment post-treatment (if applicable):   
[]  intact    []  redness- no adverse reaction    
[]redness  adverse reaction:     
40(25) min Therapeutic Exercise:  [x]  See flow sheet Rationale:      increase ROM, increase strength and improve coordination to improve the patients ability to increase functional reaching min Therapeutic Activity:   
 
 min Neuromuscular Re-ed:   
 
 min Gait Training:   
 min Patient Education:  YES  Reviewed HEP []  Progressed/Changed HEP based on: Other Objective/Functional Measures: 
 
Increased resistance with bicep curls to 3 lbs today Post Treatment Pain Level (on 0 to 10) scale:   0  / 10 ASSESSMENT Assessment/Changes in Function:  
 
Pt tolerated all therex well. She is showing improved tolerance to AROM into elevaiton and having less fatigue at end of session. []  See Progress Note/Recertification Patient will continue to benefit from skilled PT services to modify and progress therapeutic interventions, address functional mobility deficits, address ROM deficits, address strength deficits, analyze and address soft tissue restrictions, analyze and cue movement patterns, analyze and modify body mechanics/ergonomics and assess and modify postural abnormalities to attain remaining goals. Progress toward goals / Updated goals: Will continue to progress right shoulder strength/endurance. PLAN [x]  Upgrade activities as tolerated YES Continue plan of care  
[]  Discharge due to :   
[]  Other:   
 
Therapist: Elaine Hebert PT Date: 10/3/2018 Time: 10:45 AM  
 
Future Appointments Date Time Provider Stacey Hernández 10/3/2018 2:00 PM Elaine Hebert, PT [facility]  
10[facility]2018 [facility]

## 2018-10-05 ENCOUNTER — HOSPITAL ENCOUNTER (OUTPATIENT)
Dept: PHYSICAL THERAPY | Age: 78
Discharge: HOME | End: 2018-10-05
Payer: MEDICARE

## 2018-10-05 PROCEDURE — 97110 THERAPEUTIC EXERCISES: CPT

## 2018-10-05 NOTE — PROGRESS NOTES
PHYSICAL THERAPY - DAILY TREATMENT NOTE Patient Name: Lazaro Rose        Date: 10/5/2018 : 1940   YES Patient  Verified Visit #:     Insurance: Payor: Milton Re-vinyl / Plan: Kelvin Alvarenga / Product Type: Medicare / In time: 1:45 PM Out time: 2:33 PM  
Total Treatment Time: 48 Medicare Time Tracking (below) Total Timed Codes (min):  38 1:1 Treatment Time:  45 TREATMENT AREA = H/O shoulder surgery [Z98.890] SUBJECTIVE Pain Level (on 0 to 10 scale):  0 / 10 Medication Changes/New allergies or changes in medical history, any new surgeries or procedures? NO    If yes, update Summary List  
Subjective Functional Status/Changes:  []  No changes reported Patient currently reports no pain in the R shoulder. \"It's not painful when I try to feed my , it's more fatiguing than anything. Definitely a workout\" OBJECTIVE Modalities Rationale:     decrease edema, decrease inflammation and decrease pain to improve patient's ability to perform pain-free functional ADLs 
 min [] Estim, type/location:   
                                 []  att     []  unatt     []  w/US     []  w/ice    []  w/heat 
 min []  Mechanical Traction: type/lbs   
               []  pro   []  sup   []  int   []  cont    []  before manual    []  after manual  
 min []  Ultrasound, settings/location:    
 min []  Iontophoresis w/ dexamethasone, location:   
                                           []  take home patch       []  in clinic  
10 min [x]  Ice     []  Heat    location/position: To R shoulder; seated post-session  
 min []  Vasopneumatic Device, press/temp:   
 min []  Other:   
[] Skin assessment post-treatment (if applicable):   
[]  intact    []  redness- no adverse reaction    
[]redness  adverse reaction:     
38 min Therapeutic Exercise:  [x]  See flow sheet Rationale:      increase ROM, increase strength and improve coordination to improve the patients ability to regain functional, pain free functional mobility with RUE for OH/reaching activities  
 min Therapeutic Activity:   
 
 min Neuromuscular Re-ed:   
 
 min Gait Training:   
 min Patient Education:  YES  Reviewed HEP []  Progressed/Changed HEP based on: Other Objective/Functional Measures: 
 
1:1 TE = 45' Increased resistance for TB rows Increased repetitions for wall ladder Post Treatment Pain Level (on 0 to 10) scale:   0  / 10 ASSESSMENT Assessment/Changes in Function:  
 
Noted increase ms fatigue during progression of exercises, however denies sharp pain or red flags during therex. []  See Progress Note/Recertification Patient will continue to benefit from skilled PT services to modify and progress therapeutic interventions, address functional mobility deficits, address ROM deficits, address strength deficits, analyze and address soft tissue restrictions, analyze and cue movement patterns, analyze and modify body mechanics/ergonomics and assess and modify postural abnormalities to attain remaining goals. Progress toward goals / Updated goals: 
Good progress towards LTG #4 PLAN [x]  Upgrade activities as tolerated YES Continue plan of care  
[]  Discharge due to :   
[]  Other:   
 
Therapist: DESHAWN Harley Date: 10/5/2018 Time: 3:29 PM  
 
Future Appointments Date Time Provider Stacey Hernández 10/5/2018 2:00 PM Manfred Evans [facility]  
10/11/2018 2:30 PM Stafford Runner, PT [facility]  
[facility]  
10/17/2018 2:00 PM Stafford Runner, PT [facility]  
10/18/2018 2:00 PM Stafford Runner, PT [facility]  
10[facility]22/2018 3[facility]30 [facility]  
10/24/2018 2:00 PM Stafford Runner, PT [facility]  
10/29/2018 4:00 PM Stafford Runner, PT [facility]  
10/31/2018 2:30 PM Stafford Runner, PT [facility]

## 2018-10-11 ENCOUNTER — HOSPITAL ENCOUNTER (OUTPATIENT)
Dept: PHYSICAL THERAPY | Age: 78
Discharge: HOME | End: 2018-10-11
Payer: MEDICARE

## 2018-10-11 PROCEDURE — 97110 THERAPEUTIC EXERCISES: CPT | Performed by: PHYSICAL THERAPIST

## 2018-10-11 NOTE — PROGRESS NOTES
PHYSICAL THERAPY - DAILY TREATMENT NOTE Patient Name: Shannan White        Date: 10/11/2018 : 1940   YES Patient  Verified Visit #:   15   of   18  Insurance: Payor: Henry Ryan / Plan: Kelvin Vasquezy / Product Type: Medicare / In time: 2:25 Out time: 3:15 Total Treatment Time: 50 Medicare Time Tracking (below) Total Timed Codes (min):  40 1:1 Treatment Time:  40 TREATMENT AREA = H/O shoulder surgery [Z98.890] SUBJECTIVE Pain Level (on 0 to 10 scale):  0  / 10 Medication Changes/New allergies or changes in medical history, any new surgeries or procedures? NO    If yes, update Summary List  
Subjective Functional Status/Changes:  []  No changes reported Pt notes able to feed  regularly from sitting position. OBJECTIVE Modalities Rationale:     decrease edema, decrease inflammation and decrease pain to improve patient's ability to prevent soreness 
 min [] Estim, type/location:   
                                 []  att     []  unatt     []  w/US     []  w/ice    []  w/heat 
 min []  Mechanical Traction: type/lbs   
               []  pro   []  sup   []  int   []  cont    []  before manual    []  after manual  
 min []  Ultrasound, settings/location:    
 min []  Iontophoresis w/ dexamethasone, location:   
                                           []  take home patch       []  in clinic  
10 min [x]  Ice     []  Heat    location/position: R shoulder - seated after treatment  
 min []  Vasopneumatic Device, press/temp:   
 min []  Other:   
[] Skin assessment post-treatment (if applicable):   
[]  intact    []  redness- no adverse reaction    
[]redness  adverse reaction:     
40 min Therapeutic Exercise:  [x]  See flow sheet Rationale:      increase ROM, increase strength and improve coordination to improve the patients ability to increase functional reaching  
 min Therapeutic Activity:   
 
 min Neuromuscular Re-ed:   
 
 min Gait Training: min Patient Education:  YES  Reviewed HEP []  Progressed/Changed HEP based on: Other Objective/Functional Measures: 
 
Pt able to complete therex with less fatigue overall. Post Treatment Pain Level (on 0 to 10) scale:   0  / 10 ASSESSMENT Assessment/Changes in Function:  
 
Pt showing improved endurance, but her AROM remains limited to ~110 degrees. []  See Progress Note/Recertification Patient will continue to benefit from skilled PT services to modify and progress therapeutic interventions, address functional mobility deficits, address ROM deficits, address strength deficits, analyze and address soft tissue restrictions, analyze and cue movement patterns, analyze and modify body mechanics/ergonomics and assess and modify postural abnormalities to attain remaining goals. Progress toward goals / Updated goals: Will continue with AROM progression to achieve LTGs. PLAN [x]  Upgrade activities as tolerated YES Continue plan of care  
[]  Discharge due to :   
[]  Other:   
 
Therapist: Elaine Hebert PT Date: 10/11/2018 Time: 9:39 AM  
 
Future Appointments Date Time Provider Stacey Hernández 10/11/2018 2:30 PM [place] [facility] [place]  
10/12/2018 1:30 PM Niki Park [facility]  
10/17/2018 2:00 PM [place] [facility] [place]  
10/19/2018 2:00 PM Niki Park [facility]  
10/22/2018 3:30 PM Niki Park [facility]  
10/24/2018 2:00 PM [place] [facility] [place]  
10/29/2018 4:00 PM [place] [facility] [place]  
10/31/2018 2:30 PM Elaine Hebert, [place] [facility] [place]

## 2018-10-12 ENCOUNTER — HOSPITAL ENCOUNTER (OUTPATIENT)
Dept: PHYSICAL THERAPY | Age: 78
Discharge: HOME | End: 2018-10-12
Payer: MEDICARE

## 2018-10-12 PROCEDURE — 97110 THERAPEUTIC EXERCISES: CPT

## 2018-10-16 NOTE — PROGRESS NOTES
PHYSICAL THERAPY - DAILY TREATMENT NOTE Patient Name: Melvi Carey        Date: 10/16/2018 : 1940   yes Patient  Verified Visit #:   15   of   18  Insurance: Payor: Ele rFiedman / Plan: Kelvin Vasquezy / Product Type: Medicare / In time: - Out time: - Total Treatment Time: - Medicare/ BC[employer] Time Tracking (below) Total Timed Codes (min):  - 1:1 Treatment Time:  -  
TREATMENT AREA =  H/O shoulder surgery [Z98.890] SUBJECTIVE Pain Level (on 0 to 10 scale):  -  / 10 Medication Changes/New allergies or changes in medical history, any new surgeries or procedures?    no  If yes, update Summary List  
Subjective Functional Status/Changes:  []  No changes reported SEE RADHA CC DOWN  
 
  
 
OBJECTIVE Modalities Rationale:     
 min [] Estim, type/location:   
                                 []  att     []  unatt     []  w/US     []  w/ice    []  w/heat 
 min []  Mechanical Traction: type/lbs   
               []  pro   []  sup   []  int   []  cont    []  before manual    []  after manual  
 min []  Ultrasound, settings/location:    
 min []  Iontophoresis w/ dexamethasone, location:   
                                           []  take home patch       []  in clinic  
 min []  Ice     []  Heat    location/position:   
 min []  Vasopneumatic Device, press/temp:   
 min []  Other:   
[] Skin assessment post-treatment (if applicable):   
[]  intact    []  redness- no adverse reaction    
[]redness  adverse reaction:     
 min Patient Education:  yes  Reviewed HEP []  Progressed/Changed HEP based on: Other Objective/Functional Measures: 
 
See PAPER CHART Post Treatment Pain Level (on 0 to 10) scale:   -  / 10 ASSESSMENT Assessment/Changes in Function: SEE PAPER CHART 
  
[]  See Progress Note/Recertification Patient will continue to benefit from skilled PT services to modify and progress therapeutic interventions, address functional mobility deficits, address ROM deficits, address strength deficits, analyze and address soft tissue restrictions, analyze and cue movement patterns, analyze and modify body mechanics/ergonomics, assess and modify postural abnormalities, address imbalance/dizziness and instruct in home and community integration to attain remaining goals. Progress toward goals / Updated goals: SEE PAPER CHART  
 
PLAN [x]  Upgrade activities as tolerated yes Continue plan of care  
[]  Discharge due to :   
[]  Other:   
 
Therapist: Jimmy Kumar PT, DPT Date: 10/16/2018 Time: 10:09 AM  
 
Future Appointments Date Time Provider Stacey Hernández 10/17/2018 2:00 PM Ana Estrada [place] [id number]  
10/19/2018 2:00 PM [place] [id number]  
10/22/2018 3:30 PM [place] [address]  
10/24/2018 2:00 PM Ana Estrada PT [place] [address]  
10/29/2018 4:00 PM Ana Estrada PT [place] [address]  
10/31/2018 2:30 PM Ana Estrada [place] [address]

## 2018-10-17 ENCOUNTER — HOSPITAL ENCOUNTER (OUTPATIENT)
Dept: PHYSICAL THERAPY | Age: 78
Discharge: HOME | End: 2018-10-17
Payer: MEDICARE

## 2018-10-17 PROCEDURE — 97110 THERAPEUTIC EXERCISES: CPT | Performed by: PHYSICAL THERAPIST

## 2018-10-18 ENCOUNTER — APPOINTMENT (OUTPATIENT)
Dept: PHYSICAL THERAPY | Age: 78
End: 2018-10-18
Payer: MEDICARE

## 2018-10-19 ENCOUNTER — HOSPITAL ENCOUNTER (OUTPATIENT)
Dept: PHYSICAL THERAPY | Age: 78
Discharge: HOME | End: 2018-10-19
Payer: MEDICARE

## 2018-10-19 PROCEDURE — 97110 THERAPEUTIC EXERCISES: CPT

## 2018-10-19 NOTE — PROGRESS NOTES
PHYSICAL THERAPY - DAILY TREATMENT NOTE Patient Name: Scotty Smith        Date: 10/19/2018 : 1940   YES Patient  Verified Visit #:   15   of   18  Insurance: Payor: Justice Part / Plan: [address] / Product Type: Medicare / In time: 1:52 PM Out time: 2:45 PM  
Total Treatment Time: 48 Medicare Time Tracking (below) Total Timed Codes (min):  43 1:1 Treatment Time:  37 TREATMENT AREA = H/O shoulder surgery [Z98.890] SUBJECTIVE Pain Level (on 0 to 10 scale):  0 / 10 Medication Changes/New allergies or changes in medical history, any new surgeries or procedures? NO    If yes, update Summary List  
Subjective Functional Status/Changes:  []  No changes reported Patient reports increase fatigue today due to feeding  for >1 hr.  
  
 
OBJECTIVE Modalities Rationale:     decrease edema, decrease inflammation and decrease pain to improve patient's ability to perform pain-free functional ADLs 
 min [] Estim, type/location:   
                                 []  att     []  unatt     []  w/US     []  w/ice    []  w/heat 
 min []  Mechanical Traction: type/lbs   
               []  pro   []  sup   []  int   []  cont    []  before manual    []  after manual  
 min []  Ultrasound, settings/location:    
 min []  Iontophoresis w/ dexamethasone, location:   
                                           []  take home patch       []  in clinic  
10 min [x]  Ice     []  Heat    location/position: To R shoulder; seated post-session  
 min []  Vasopneumatic Device, press/temp:   
 min []  Other:   
[] Skin assessment post-treatment (if applicable):   
[]  intact    []  redness- no adverse reaction    
[]redness  adverse reaction:     
43 min Therapeutic Exercise:  [x]  See flow sheet Rationale:      increase ROM, increase strength and improve coordination to improve the patients ability to regain functional, pain free functional mobility with RUE for OH/reaching activities min Therapeutic Activity:   
 
 min Neuromuscular Re-ed:   
 
 min Gait Training:   
 min Patient Education:  YES  Reviewed HEP []  Progressed/Changed HEP based on: Other Objective/Functional Measures: 
 
1:1 TE = 37' Increase repetitions with elevation AROM Post Treatment Pain Level (on 0 to 10) scale:   0  / 10 ASSESSMENT Assessment/Changes in Function: Able to perform increase repetitions of elevation AROM, however continues to have fatigue during exercise. Denies sharp pain or red flags during PT session 
  
[]  See Progress Note/Recertification Patient will continue to benefit from skilled PT services to modify and progress therapeutic interventions, address functional mobility deficits, address ROM deficits, address strength deficits, analyze and address soft tissue restrictions, analyze and cue movement patterns, analyze and modify body mechanics/ergonomics and assess and modify postural abnormalities to attain remaining goals. Progress toward goals / Updated goals: 
Good progress towards LTGs #3 and #4 PLAN [x]  Upgrade activities as tolerated YES Continue plan of care  
[]  Discharge due to :   
[]  Other:   
 
Therapist: DESHAWN Gilmore Cea Date: 10/19/2018 Time: 2:55 PM  
 
Future Appointments Date Time Provider Stacey Hernández 10/19/2018  2:00 PM [facility]  
10/22/2018  3:30 PM [facility]  
10/24/2018  2:00 PM Tara Ott, [place] [facility] [place]  
10/29/2018  4:00 PM Tara Ott, [place] [facility] [place]  
10/31/2018  2:30 PM Symone Lombardo, [facility] [place]

## 2018-10-22 ENCOUNTER — HOSPITAL ENCOUNTER (OUTPATIENT)
Dept: PHYSICAL THERAPY | Age: 78
Discharge: HOME | End: 2018-10-22
Payer: MEDICARE

## 2018-10-22 PROCEDURE — 97110 THERAPEUTIC EXERCISES: CPT

## 2018-10-22 NOTE — PROGRESS NOTES
PHYSICAL THERAPY - DAILY TREATMENT NOTE Patient Name: Otilia Mazariegos        Date: 10/22/2018 : 1940   YES Patient  Verified Visit #:     Insurance: Payor: Zackery Tribunats / Plan: Kelvin Alvarenga / Product Type: Medicare / In time: 3:30 PM Out time: 4:24 PM  
Total Treatment Time: 47 Medicare Time Tracking (below) Total Timed Codes (min):  44 1:1 Treatment Time:  24 TREATMENT AREA = Right shoulder pain [M25.511] SUBJECTIVE Pain Level (on 0 to 10 scale):  0 / 10 Medication Changes/New allergies or changes in medical history, any new surgeries or procedures? NO    If yes, update Summary List  
Subjective Functional Status/Changes:  []  No changes reported \"I didn't have to take a break when I was feeding my  today so I'm not as tired. I was surprised that I wasn't feeling tired after increasing the repetitions. \" OBJECTIVE Modalities Rationale:     decrease edema, decrease inflammation and decrease pain to improve patient's ability to perform pain-free functional ADLs 
 min [] Estim, type/location:   
                                 []  att     []  unatt     []  w/US     []  w/ice    []  w/heat 
 min []  Mechanical Traction: type/lbs   
               []  pro   []  sup   []  int   []  cont    []  before manual    []  after manual  
 min []  Ultrasound, settings/location:    
 min []  Iontophoresis w/ dexamethasone, location:   
                                           []  take home patch       []  in clinic  
10 min [x]  Ice     []  Heat    location/position: To R shoulder; seated post-session  
 min []  Vasopneumatic Device, press/temp:   
 min []  Other:   
[] Skin assessment post-treatment (if applicable):   
[]  intact    []  redness- no adverse reaction    
[]redness  adverse reaction:     
 min Therapeutic Exercise:  [x]  See flow sheet Rationale:      increase ROM, increase strength and improve coordination to improve the patients ability to regain functional, pain free functional mobility with RUE for OH/reaching activities  
 min Therapeutic Activity:   
 
 min Neuromuscular Re-ed:   
 
 min Gait Training:   
 min Patient Education:  YES  Reviewed HEP []  Progressed/Changed HEP based on: Other Objective/Functional Measures: 
 
1:1 TE = 24' Increase repetitions ball roll on mat and ball on roll on mat with ER Post Treatment Pain Level (on 0 to 10) scale:   0 / 10 ASSESSMENT Assessment/Changes in Function:  
 
Req'd min cues for proper technique during therex. Demonstrated good tolerance with progression of exercises; denies sharp pain or red flags during PT session. []  See Progress Note/Recertification Patient will continue to benefit from skilled PT services to modify and progress therapeutic interventions, address functional mobility deficits, address ROM deficits, address strength deficits, analyze and address soft tissue restrictions, analyze and cue movement patterns, analyze and modify body mechanics/ergonomics and assess and modify postural abnormalities to attain remaining goals. Progress toward goals / Updated goals: 
Good progress towards LTGs #2 and #3 PLAN [x]  Upgrade activities as tolerated YES Continue plan of care  
[]  Discharge due to :   
[]  Other:   
 
Therapist: DESHAWN Boyd Date: 10/22/2018 Time: 5:26 PM  
 
Future Appointments Date Time Provider Stacey Hernández 10[facility]22[facility]2018  [facility]  
10/24/2018  2:00 PM Carlotta Bar, [place] [facility] [place]  
10/29/2018  4:00 PM Carlotta Bar, [place] [facility] [place]  
10/31/2018  2:30 PM Padmini Lombardo, [place] [facility] [place]

## 2018-10-24 ENCOUNTER — HOSPITAL ENCOUNTER (OUTPATIENT)
Dept: PHYSICAL THERAPY | Age: 78
Discharge: HOME | End: 2018-10-24
Payer: MEDICARE

## 2018-10-24 PROCEDURE — 97110 THERAPEUTIC EXERCISES: CPT | Performed by: PHYSICAL THERAPIST

## 2018-10-24 NOTE — PROGRESS NOTES
PHYSICAL THERAPY - DAILY TREATMENT NOTE Patient Name: Zabrina Parker        Date: 10/24/2018 : 1940   YES Patient  Verified Visit #:     Insurance: Payor: [employer] / Plan: Kelvin Alvarenga / Product Type: Medicare / In time: 1:55 Out time: 2:45 Total Treatment Time: 50 Medicare Time Tracking (below) Total Timed Codes (min):  25 1:1 Treatment Time:  25 TREATMENT AREA = Right shoulder pain [M25.511] SUBJECTIVE Pain Level (on 0 to 10 scale):  0  / 10 Medication Changes/New allergies or changes in medical history, any new surgeries or procedures? NO    If yes, update Summary List  
Subjective Functional Status/Changes:  []  No changes reported Pt reports doing errands all day and did not go and feed her  at the [facility] today. OBJECTIVE Modalities Rationale:     decrease edema, decrease inflammation and decrease pain to improve patient's ability to prevent soreness 
 min [] Estim, type/location:   
                                 []  att     []  unatt     []  w/US     []  w/ice    []  w/heat 
 min []  Mechanical Traction: type/lbs   
               []  pro   []  sup   []  int   []  cont    []  before manual    []  after manual  
 min []  Ultrasound, settings/location:    
 min []  Iontophoresis w/ dexamethasone, location:   
                                           []  take home patch       []  in clinic  
10 min [x]  Ice     []  Heat    location/position: R shoulder - seated after treatment  
 min []  Vasopneumatic Device, press/temp:   
 min []  Other:   
[] Skin assessment post-treatment (if applicable):   
[]  intact    []  redness- no adverse reaction    
[]redness  adverse reaction:     
40(25) min Therapeutic Exercise:  [x]  See flow sheet Rationale:      increase ROM, increase strength and improve coordination to improve the patients ability to increase endurance with fucnitonal reaching/ADLs  
 min Therapeutic Activity: min Neuromuscular Re-ed:   
 
 min Gait Training:   
 min Patient Education:  Onel Martin []  Progressed/Changed HEP based on: Other Objective/Functional Measures: 
 
Pt is showing improved endurance by being able to maintain height of AROM throughout entirety of sets Post Treatment Pain Level (on 0 to 10) scale:   0  / 10 ASSESSMENT Assessment/Changes in Function:  
 
Pt showing good endurance with AROM []  See Progress Note/Recertification Patient will continue to benefit from skilled PT services to modify and progress therapeutic interventions, address functional mobility deficits, address ROM deficits, address strength deficits, analyze and address soft tissue restrictions, analyze and cue movement patterns, analyze and modify body mechanics/ergonomics and assess and modify postural abnormalities to attain remaining goals. Progress toward goals / Updated goals: 
Progressing well toward all goals will plan to DC next week if performance remains consistent. PLAN [x]  Upgrade activities as tolerated YES Continue plan of care  
[]  Discharge due to :   
[]  Other:   
 
Therapist: Stafford Runner, PT Date: 10/24/2018 Time: 11:52 AM  
 
Future Appointments Date Time Provider Stacey Hernández 10/24/2018  2:00 PM Cain Baeza PT [facility] [place]  
10/29/2018  4:00 PM Cain Baeza PT [facility] [place]  
10/31/2018  2:30 PM Aren Lombardo, PT [facility] [place]

## 2018-10-29 ENCOUNTER — HOSPITAL ENCOUNTER (OUTPATIENT)
Dept: PHYSICAL THERAPY | Age: 78
Discharge: HOME | End: 2018-10-29
Payer: MEDICARE

## 2018-10-29 PROCEDURE — 97110 THERAPEUTIC EXERCISES: CPT | Performed by: PHYSICAL THERAPIST

## 2018-10-29 NOTE — PROGRESS NOTES
PHYSICAL THERAPY - DAILY TREATMENT NOTE Patient Name: Denise Palacio        Date: 10/29/2018 : 1940   YES Patient  Verified Visit #:     Insurance: Payor: Antonia Boone / Plan: Kelvin Alvarenga / Product Type: Medicare / In time: 3:55 Out time: 4:50 Total Treatment Time: 54 Medicare Time Tracking (below) Total Timed Codes (min):  40 1:1 Treatment Time:  40 TREATMENT AREA = Right shoulder pain [M25.511] SUBJECTIVE Pain Level (on 0 to 10 scale):  0  / 10 Medication Changes/New allergies or changes in medical history, any new surgeries or procedures? NO    If yes, update Summary List  
Subjective Functional Status/Changes:  []  No changes reported Pt denies having any pain in her shoulder. She has been using it more and more each day. OBJECTIVE Modalities Rationale:     decrease edema, decrease inflammation and decrease pain to improve patient's ability to regain functional reach/endurance for ADls 
 min [] Estim, type/location:   
                                 []  att     []  unatt     []  w/US     []  w/ice    []  w/heat 
 min []  Mechanical Traction: type/lbs   
               []  pro   []  sup   []  int   []  cont    []  before manual    []  after manual  
 min []  Ultrasound, settings/location:    
 min []  Iontophoresis w/ dexamethasone, location:   
                                           []  take home patch       []  in clinic  
10 min [x]  Ice     []  Heat    location/position: R shoudler- seated after treatment  
 min []  Vasopneumatic Device, press/temp:   
 min []  Other:   
[] Skin assessment post-treatment (if applicable):   
[]  intact    []  redness- no adverse reaction    
[]redness  adverse reaction:     
45(40 min Therapeutic Exercise:  [x]  See flow sheet Rationale:      increase ROM, increase strength and improve coordination to improve the patients ability to increase endurance with functional reaching min Therapeutic Activity:   
 
 min Neuromuscular Re-ed:   
 
 min Gait Training:   
 min Patient Education:  YES  Reviewed HEP []  Progressed/Changed HEP based on: Other Objective/Functional Measures: 
 
Pt completed all therex with minimal VCs Added 1lb to ball roll with elevation today Post Treatment Pain Level (on 0 to 10) scale:   0  / 10 ASSESSMENT Assessment/Changes in Function:  
 
Pt showing improved strength and endurance. Will plan to DC next session if symptoms/performance remain consistent. []  See Progress Note/Recertification Patient will continue to benefit from skilled PT services to modify and progress therapeutic interventions, address functional mobility deficits, address ROM deficits, address strength deficits, analyze and address soft tissue restrictions, analyze and cue movement patterns, analyze and modify body mechanics/ergonomics and assess and modify postural abnormalities to attain remaining goals. Progress toward goals / Updated goals: 
Progressing toward all LTGs. PLAN [x]  Upgrade activities as tolerated YES Continue plan of care  
[]  Discharge due to :   
[]  Other:   
 
Therapist: Mehran Horowitz PT Date: 10/29/2018 Time: 10:39 AM  
 
Future Appointments Date Time Provider Stacey Hernández 10/29/2018  4:00 PM [place] [facility] [place]  
10/31/2018  2:30 PM Mena [place]ardo, [place] [facility] [place]

## 2018-10-31 ENCOUNTER — HOSPITAL ENCOUNTER (OUTPATIENT)
Dept: PHYSICAL THERAPY | Age: 78
Discharge: HOME | End: 2018-10-31
Payer: MEDICARE

## 2018-10-31 PROCEDURE — G8985 CARRY GOAL STATUS: HCPCS | Performed by: PHYSICAL THERAPIST

## 2018-10-31 PROCEDURE — 97110 THERAPEUTIC EXERCISES: CPT | Performed by: PHYSICAL THERAPIST

## 2018-10-31 PROCEDURE — G8986 CARRY D/C STATUS: HCPCS | Performed by: PHYSICAL THERAPIST

## 2018-10-31 NOTE — PROGRESS NOTES
PHYSICAL THERAPY - DAILY TREATMENT NOTE Patient Name: Jesus Jones        Date: 10/31/2018 : 1940   YES Patient  Verified Visit #:     Insurance: Payor: Silvio Reed / Plan: Kelvin Alvarenga / Product Type: Medicare / In time: 2:30 Out time: 3:25 Total Treatment Time: 54 Medicare Time Tracking (below) Total Timed Codes (min):  30 1:1 Treatment Time:  30 TREATMENT AREA = Right shoulder pain [M25.511] SUBJECTIVE Pain Level (on 0 to 10 scale):  0  / 10 Medication Changes/New allergies or changes in medical history, any new surgeries or procedures? NO    If yes, update Summary List  
Subjective Functional Status/Changes:  []  No changes reported Pt reporst no pain in shoulder and she is feeling good about doing her HEP. OBJECTIVE Modalities Rationale:     decrease edema, decrease inflammation and decrease pain to improve patient's ability to prevent soreness 
 min [] Estim, type/location:   
                                 []  att     []  unatt     []  w/US     []  w/ice    []  w/heat 
 min []  Mechanical Traction: type/lbs   
               []  pro   []  sup   []  int   []  cont    []  before manual    []  after manual  
 min []  Ultrasound, settings/location:    
 min []  Iontophoresis w/ dexamethasone, location:   
                                           []  take home patch       []  in clinic  
10 min [x]  Ice     []  Heat    location/position: R Shoulder - seated after treatment  
 min []  Vasopneumatic Device, press/temp:   
 min []  Other:   
[] Skin assessment post-treatment (if applicable):   
[]  intact    []  redness- no adverse reaction    
[]redness  adverse reaction:     
45(30) min Therapeutic Exercise:  [x]  See flow sheet Rationale:      increase ROM, increase strength and improve coordination to improve the patients ability to regain functional reach  
 min Therapeutic Activity:   
 
 min Neuromuscular Re-ed: min Gait Training:   
 min Patient Education:  Dorothy Loop []  Progressed/Changed HEP based on: Other Objective/Functional Measures: FOTO - 61 Pt shows independence with HEP Able to actively elevate to 120° and hold in that position Post Treatment Pain Level (on 0 to 10) scale:   0  / 10 ASSESSMENT Assessment/Changes in Function:  
 
Pt is progressing toward all goals and will  Be discharged with HEP. [x]  See DC Note Progress toward goals / Updated goals: 
Progressing toward or achieved all goals. PLAN [x]  Upgrade activities as tolerated   
[x]  Discharge due to : Progressing toward or met all LTGs []  Other:   
 
Therapist: Hernandez Newman, PT Date: 10/31/2018 Time: 11:10 AM  
 
Future Appointments Date Time Provider Stacey Hernández 10/31/2018  2:30 PM Salo Lombardo, PT [facility] [place]

## 2018-10-31 NOTE — PROGRESS NOTES
Tori Fry 31  [facility] PHYSICAL THERAPY  [facility] 
Ishan Huggins [facility], [address] ,[address], [address]  Phone: [phone]  Fax: [phone] DISCHARGE SUMARY FOR PHYSICAL THERAPY Patient Name: Felipe Skiff : 1940 Treatment/Medical Diagnosis: Right shoulder pain [M25.511] Onset Date: 18 Referral Source: Dulce Sharma [facility]): 18 Prior Hospitalization: See Medical History Provider #: [id number] Prior Level of Function: Limited due of R UE since falling, then tearing her RC in 2018 Comorbidities: H/o prior RSA ~10 yrs ago, B MAGALI, B TKA, polio, thyroid dysfunction, h/o breast cancer Medications: Verified on Patient Summary List  
Visits from [facility]: 19 Missed Visits: 0 Goal/Measure of Progress Goal Met? 1.  Pt to increase FOTO score from 54 to >/= 67.  
Status at last Eval: 54 Current Status: 60 Progressing 2. Pt able to reach actively to 120 degrees to allow use of right in kitchen without symptoms. Status at last Eval: 105° Current Status: 120° yes 3. Pt to report ability to use right UE for feeding her dependent  without symptoms. Status at last Eval: Performed once Current Status: Able to perform daily yes 4. Pt independent with high level HEP for right shoddler AROM/gentle strengthening. Status at last Eval: Partial  Current Status: Met yes Key Functional Changes/Progress: Ms Maria T Fernández has been able to gradually increase her right shoulder AROM/endurance. She was last seen on 10/31/18, and was nilesh to actively elevate right shoulder to 120° of flexion and hold in that position. Pt has been able to feed her  at the [facility] each day without getting right arm pain or fatigue for the past 2 weeks. She is independent with HEP for continued strength/endurance and will be discharged from PT. G-Codes (GP): Carry  [id number] Goal  CJ= 20-39%  D/C  CK= 40-59%. The severity rating is based on the FOTO Score Assessments/Recommendations: Discontinue therapy. Progressing towards or have reached established goals. If you have any questions/comments please contact us directly at [phone]. Thank you for allowing us to assist in the care of your patient. Therapist Signature: Destini Kong, PT Date: 10/31/18 Reporting Period: 10/1/18 - 10/31/18 Time: 11:12 AM

## 2019-12-30 ENCOUNTER — HOSPITAL ENCOUNTER (OUTPATIENT)
Dept: PHYSICAL THERAPY | Age: 79
Discharge: HOME | End: 2019-12-30
Payer: MEDICARE

## 2019-12-30 PROCEDURE — 97162 PT EVAL MOD COMPLEX 30 MIN: CPT

## 2019-12-30 PROCEDURE — 97110 THERAPEUTIC EXERCISES: CPT

## 2019-12-30 NOTE — PROGRESS NOTES
Tori Fry 30  [facility] [place] PHYSICAL THERAPY AT [facility] [address], [address] ,[address], [address]  Phone: [phone]  Fax: [phone] / [address] [facility]  Patient Name: Marianne Lr : 1940   Medical   Diagnosis: L foot pain  Treatment Diagnosis: Pain in left foot [M79.672]   Onset Date: 10/2/19     Referral Source: Pedro Echeverria MD [facility]): 2019   Prior Hospitalization: See medical history Provider #: [id number]   Prior Level of Function: Painful, limited ambulation with use of FWW/SPC due to left foot pain. I with all ADLs and driving    Comorbidities: H/o breast cancer, R shoulder shoulder, Bilateral knee and hip replacements, hearing impairment, visual impairment, HTN, arthritis, thyroid dysfunction. Medications: Verified on Patient Summary List   The Plan of Care and following information is based on the information from the initial evaluation.   ===========================================================================================  Assessment / key information:  Patient is an [de-identified]year old female presenting to [facility] with a dx of peroneal tenonitis of the lower leg, left. She had subtalar arthrodesis (Talus + calcaneus) as well as repair of peroneus brevis tendon tear on 10/2/19. She reports going to SNF x 3 weeks after surgery while in a cast, transitioned to home health therapy x 4 weeks and now has transitioned out of CAM boot and into slipper type shoes within the past week. She reports she is unable to don any tennis shoes that she has within the home due to overall increased swelling throughout the L foot. She reports pain is made worse with prolonged walking and standing.   Today upon objective examination the following was found; 1) significant pitting edema throughout the L foot 2) DF: -15 degrees, PF 40 degrees, minimal inversion and eversion PROM today 3) FHL strength 3+/5, DF strength 3-/5, PF strength 3+/5 4) antalgic gait pattern with SPC and step to gait pattern with dec weight shift into the LLE. Patient signs and sx are consistent with loss of ROM, strength, balance, and proprioception s/p arthrodesis of the L foot. Patient would benefit from skilled PT services in order to increase strength, range of motion, balance, proprioception, coordination in order to improve ease with ADLs and functional mobility.    ===========================================================================================  Eval Complexity: History HIGH Complexity :3+ comorbidities / personal factors will impact the outcome/ POC ;  Examination  MEDIUM Complexity : 3 Standardized tests and measures addressing body structure, function, activity limitation and / or participation in recreation ; Presentation MEDIUM Complexity : Evolving with changing characteristics ; Decision Making MEDIUM Complexity : FOTO score of 26-74; Overall Complexity MEDIUM  Problem List: pain affecting function, decrease ROM, decrease strength, edema affecting function, impaired gait/ balance, decrease ADL/ functional abilitiies, decrease activity tolerance, decrease flexibility/ joint mobility and decrease transfer abilities   Treatment Plan may include any combination of the following: Therapeutic exercise, Therapeutic activities, Neuromuscular re-education, Physical agent/modality, Gait/balance training, Manual therapy, Aquatic therapy, Patient education, Self Care training and Functional mobility training  Patient / Family readiness to learn indicated by: asking questions, trying to perform skills and interest  Persons(s) to be included in education: patient (P)  Barriers to Learning/Limitations: None  Measures taken, if barriers to learning:    Patient Goal (s): \"less swelling, pain + stiffness\"    Patient self reported health status: good  Rehabilitation Potential: good   Short Term Goals:  To be accomplished in  3  weeks:  1) Patient will be I and compliant with a basic home exercise program in order to maintain gains made in physical therapy. 2) Patient will ambulate with normalized gait pattern with least restrictive device in order to allow for ease with shopping. 3) Patient will restore DF AROM to neutral in order to allow for ease with ambulation.  Long Term Goals: To be accomplished in  6  weeks:  1) Patient will be I and compliant with a progressive, high level HEP in order to maintain gains made in physical therapy  2) Patient will increase strength of the L ankle to 4/5 in order to allow for ease with stair negotiation. 3) Patient will report pain to less than 2/10 in order to allow for care of grandchildren. Frequency / Duration:   Patient to be seen  2  times per week for 6  weeks:  Patient / Caregiver education and instruction: self care, activity modification and exercises  Therapist Signature: Anabell Brown PT DPT  Date: 51/87/2069   Certification Period: 12/30/19- 3/29/19 Time: 5:01 PM   ===========================================================================================  I certify that the above Physical Therapy Services are being furnished while the patient is under my care. I agree with the treatment plan and certify that this therapy is necessary. Physician Signature:        Date:       Time:     Please sign and return to [facility] [place] or you may fax the signed copy to [phone]. Thank you.

## 2019-12-30 NOTE — PROGRESS NOTES
PHYSICAL THERAPY - DAILY TREATMENT NOTE    Patient Name: Richard Persaud        Date: 2019  : 1940   YES Patient  Verified  Visit #:     Insurance: Payor: Jen Dewitt / Plan: [place] & [place] / Product Type: Medicare /      In time: 315 Out time: 415   Total Treatment Time: 60     BCBS/Medicare Time Tracking (below)   Total Timed Codes (min):  50 1:1 Treatment Time:  50     TREATMENT AREA =  Pain in left foot [M79.392]    SUBJECTIVE  Pain Level (on 0 to 10 scale):  6  / 10   Medication Changes/New allergies or changes in medical history, any new surgeries or procedures? NO    If yes, update Summary List   Subjective Functional Status/Changes:  []  No changes reported     Patient reports she was up on her feet more than expected today and she can feel the swelling in her foot.             Modalities Rationale:     decrease inflammation and decrease pain to improve patient's ability to perform pain free ADLs    min [] Estim, type/location:                                      []  att     []  unatt     []  w/US     []  w/ice    []  w/heat    min []  Mechanical Traction: type/lbs                   []  pro   []  sup   []  int   []  cont    []  before manual    []  after manual    min []  Ultrasound, settings/location:      min []  Iontophoresis w/ dexamethasone, location:                                               []  take home patch       []  in clinic   10 min [x]  Ice     []  Heat    location/position: L foot in supine with elevation     min []  Vasopneumatic Device, press/temp:     min []  Other:    [x] Skin assessment post-treatment (if applicable):    [x]  intact    [x]  redness- no adverse reaction     []redness  adverse reaction:        10 min Therapeutic Exercise:  [x]  See flow sheet   Rationale:      increase ROM, increase strength, improve coordination, improve balance and increase proprioception to improve the patients ability to perform unlimited ADLs        Billed With/As:   [] TE   [] TA   [] Neuro   [] Self Care Patient Education: [x] Review HEP    [] Progressed/Changed HEP based on:   [] positioning   [] body mechanics   [] transfers   [] heat/ice application    [] other:      Other Objective/Functional Measures:    Notable pitting edema  Incisions are healed well  DF: -15 degrees, PF 40 degrees  FHL strength 3+/5, DF strength 3-/5, PF strength 3+/5     Post Treatment Pain Level (on 0 to 10) scale:   2 / 10     ASSESSMENT  Assessment/Changes in Function:     See POC      []  See Progress Note/Recertification   Patient will continue to benefit from skilled PT services to modify and progress therapeutic interventions, address functional mobility deficits, address ROM deficits, address strength deficits, analyze and address soft tissue restrictions, analyze and cue movement patterns, analyze and modify body mechanics/ergonomics, assess and modify postural abnormalities, address imbalance/dizziness and instruct in home and community integration to attain remaining goals.    Progress toward goals / Updated goals:    See POC      PLAN  [x]  Upgrade activities as tolerated YES Continue plan of care   []  Discharge due to :    []  Other:      Therapist: Ester Quiroz    Date: 12/30/2019 Time: 5:01 PM     Future Appointments   Date Time Provider Stacey Hernández   1/6/2020  3:00 PM Kobiie Parents, PT [facility]   1/8/2020 12:00 PM [facility]   1/13/2020  2:00 PM Kobiie Parents, PT [facility]   1/15/2020  1:00 PM Kobiie Parents, PT [facility]   1/20/2020  2:00 PM [facility]   1/22/2020  1:00 PM Kobiie Parents, PT [facility]   1/27/2020  2:30 PM [facility]   1/29/2020  1:00 PM Kenzie Parents, PT [facility]   2/3/2020  2:00 PM [facility]   2/5/2020  1:00 PM Kenzie Parents, PT [facility]   [facility]10[facility]2020  [facility]   2/12/2020  2:00 PM Grupo, Pat Jacques, PT [facility]

## 2020-01-06 ENCOUNTER — HOSPITAL ENCOUNTER (OUTPATIENT)
Dept: PHYSICAL THERAPY | Age: 80
Discharge: HOME | End: 2020-01-06
Payer: MEDICARE

## 2020-01-06 PROCEDURE — 97110 THERAPEUTIC EXERCISES: CPT | Performed by: PHYSICAL THERAPIST

## 2020-01-06 NOTE — PROGRESS NOTES
PHYSICAL THERAPY - DAILY TREATMENT NOTE    Patient Name: Luli Lindsay        Date: 2020  : 1940   YES Patient  Verified  Visit #:     Insurance: Payor: Dave Signs / Plan: [place] MEDICARE PART A & B / Product Type: Medicare /      In time: 3:00 Out time: 3:50   Total Treatment Time: 50     BCBS/Medicare Time Tracking (below)   Total Timed Codes (min):  40 1:1 Treatment Time:  40     TREATMENT AREA =  Pain in left foot [M79.052]    SUBJECTIVE  Pain Level (on 0 to 10 scale):  0  / 10   Medication Changes/New allergies or changes in medical history, any new surgeries or procedures? NO    If yes, update Summary List   Subjective Functional Status/Changes:  []  No changes reported     Pt denies having any pain, but her ankle feel sitff. She presents with compression socks, but has bene unable to get them on.           Modalities Rationale:     decrease inflammation and decrease pain to improve patient's ability to increase walking tolerance   min [] Estim, type/location:                                      []  att     []  unatt     []  w/US     []  w/ice    []  w/heat    min []  Mechanical Traction: type/lbs                   []  pro   []  sup   []  int   []  cont    []  before manual    []  after manual    min []  Ultrasound, settings/location:      min []  Iontophoresis w/ dexamethasone, location:                                               []  take home patch       []  in clinic   10 min [x]  Ice     []  Heat    location/position: L foot in supine with elevation     min []  Vasopneumatic Device, press/temp:     min []  Other:    [] Skin assessment post-treatment (if applicable):    []  intact    []  redness- no adverse reaction     []redness  adverse reaction:        40 min Therapeutic Exercise:  [x]  See flow sheet   Rationale:      increase ROM, increase strength, improve coordination, improve balance and increase proprioception to improve the patients ability to increase standing/wlaking tolerance     Billed With/As:   [] TE   [] TA   [] Neuro   [] Self Care Patient Education: [x] Review HEP    [] Progressed/Changed HEP based on:   [] positioning   [] body mechanics   [] transfers   [] heat/ice application    [] other:      Other Objective/Functional Measures: Added light theraband (peach) DF/PF     Post Treatment Pain Level (on 0 to 10) scale:   0  / 10     ASSESSMENT  Assessment/Changes in Function:     Pt tolerated all therex well. She has trace amounts of inversion/eversion without pain. I was able get a compression sock on her L foot. She was able to get the sock off on her own, so it was agreed to leave it on for the remainder of the day, as she could take it off later. []  See Progress Note/Recertification   Patient will continue to benefit from skilled PT services to modify and progress therapeutic interventions, address functional mobility deficits, address ROM deficits, address strength deficits, analyze and address soft tissue restrictions, analyze and cue movement patterns, analyze and modify body mechanics/ergonomics, assess and modify postural abnormalities and address imbalance/dizziness to attain remaining goals. Progress toward goals / Updated goals:    Pt showing good tolerance to therex, will pogress to standing therex once swelling is under control.      PLAN  [x]  Upgrade activities as tolerated YES Continue plan of care   []  Discharge due to :    []  Other:      Therapist: Vandana Flores PT    Date: 1/6/2020 Time: 10:44 AM     Future Appointments   Date Time Provider Stacey Hernández   1/6/2020  3:00 PM Yue Flores, PT [facility]   1/8/2020 12:00 PM Liz [facility]   1/13/2020  2:00 PM Yue Berry, PT [facility]   1/15/2020  1:00 PM Yue Flores, PT [facility]   1/20/2020  2:00 PM Liz [facility]   1/22/2020  1:00 PM Yue Flores, PT [facility]   1/27/2020  2:30 PM Yue Flores, PT [facility]   1/29/2020  1:00 PM Javier Lombardo [place]ie [place] [facility]   2/3/2020  2:00 PM [facility]   2/5/2020  1:00 PM Rl Sethi, PT [facility]   2/10/2020  2:00 PM [facility]   2/12/2020  2:00 PM Merary Lombardo, PT [facility]

## 2020-01-08 ENCOUNTER — HOSPITAL ENCOUNTER (OUTPATIENT)
Dept: PHYSICAL THERAPY | Age: 80
Discharge: HOME | End: 2020-01-08
Payer: MEDICARE

## 2020-01-08 PROCEDURE — 97110 THERAPEUTIC EXERCISES: CPT

## 2020-01-08 NOTE — PROGRESS NOTES
PHYSICAL THERAPY - DAILY TREATMENT NOTE    Patient Name: Angelito Dan        Date: 2020  : 1940   YES Patient  Verified  Visit #:   3   of   12  Insurance: Payor: Niyah Znuiga / Plan: [place] MEDICARE PART A & B / Product Type: Medicare /      In time: 1200 Out time: 105   Total Treatment Time: 65     BCBS/Medicare Time Tracking (below)   Total Timed Codes (min):  55 1:1 Treatment Time:  40     TREATMENT AREA =  Pain in left foot [M79.472]    SUBJECTIVE  Pain Level (on 0 to 10 scale):  0  / 10   Medication Changes/New allergies or changes in medical history, any new surgeries or procedures? NO    If yes, update Summary List   Subjective Functional Status/Changes:  []  No changes reported     Patient reports she was able to get a pair of sneakers that fit her feet and she has noticed improved stability and decreased pain.             Modalities Rationale:     decrease inflammation and decrease pain to improve patient's ability to perform pain free ambulation   min [] Estim, type/location:                                      []  att     []  unatt     []  w/US     []  w/ice    []  w/heat    min []  Mechanical Traction: type/lbs                   []  pro   []  sup   []  int   []  cont    []  before manual    []  after manual    min []  Ultrasound, settings/location:      min []  Iontophoresis w/ dexamethasone, location:                                               []  take home patch       []  in clinic   10 min [x]  Ice     []  Heat    location/position: L foot in supine     min []  Vasopneumatic Device, press/temp:     min []  Other:    [x] Skin assessment post-treatment (if applicable):    [x]  intact    [x]  redness- no adverse reaction     []redness  adverse reaction:        55/40 min Therapeutic Exercise:  [x]  See flow sheet   Rationale:      increase ROM, increase strength, improve coordination, improve balance and increase proprioception to improve the patients ability to perform unlimited ADLS Billed With/As:   [] TE   [] TA   [] Neuro   [] Self Care Patient Education: [x] Review HEP    [] Progressed/Changed HEP based on:   [] positioning   [] body mechanics   [] transfers   [] heat/ice application    [] other:      Other Objective/Functional Measures: Added mini squat and sls with BUE support      Post Treatment Pain Level (on 0 to 10) scale:   0  / 10     ASSESSMENT  Assessment/Changes in Function:     Patient is making gradual progress with physical therapy treatment requiring 100% cuing throughout to complete exercises fully. Overall swelling and edema is reduced with elevation and use of cryotherapy. []  See Progress Note/Recertification   Patient will continue to benefit from skilled PT services to modify and progress therapeutic interventions, address functional mobility deficits, address ROM deficits, address strength deficits, analyze and address soft tissue restrictions, analyze and cue movement patterns, analyze and modify body mechanics/ergonomics, assess and modify postural abnormalities, address imbalance/dizziness and instruct in home and community integration to attain remaining goals.    Progress toward goals / Updated goals:    Progressing towads LTG 2      PLAN  [x]  Upgrade activities as tolerated YES Continue plan of care   []  Discharge due to :    []  Other:      Therapist: Bo Del Angel    Date: 1/8/2020 Time: 1:04 PM     Future Appointments   Date Time Provider Stacey Hernández   1/13/2020  2:00 PM Teresa Oar, PT [facility]   1/15/2020  1:00 PM Teresa Oar, PT [facility]   1/20/2020  2:00 PM Choe Dural [facility]   1/22/2020  1:00 PM Teresa Oar, PT [facility]   1/27/2020  2:30 PM Teresa Oar, PT [facility]   1/29/2020  1:00 PM Teresa Oar, PT [facility]   2/3/2020  2:00 PM Choe Dural [facility]   2/5/2020  1:00 PM Teresa Oar, PT [facility]   2/10/2020  2:00 PM Choe Dural [facility]   2/12/2020  2:00 PM Lyn Lombardo, PT [facility]

## 2020-01-13 ENCOUNTER — HOSPITAL ENCOUNTER (OUTPATIENT)
Dept: PHYSICAL THERAPY | Age: 80
End: 2020-01-13
Payer: MEDICARE

## 2020-01-15 ENCOUNTER — HOSPITAL ENCOUNTER (OUTPATIENT)
Dept: PHYSICAL THERAPY | Age: 80
Discharge: HOME | End: 2020-01-15
Payer: MEDICARE

## 2020-01-15 PROCEDURE — 97110 THERAPEUTIC EXERCISES: CPT | Performed by: PHYSICAL THERAPIST

## 2020-01-15 NOTE — PROGRESS NOTES
PHYSICAL THERAPY - DAILY TREATMENT NOTE    Patient Name: Jesus Bee        Date: 1/15/2020  : 1940   YES Patient  Verified  Visit #:     Insurance: Payor: Monty Inman / Plan: [place] MEDICARE PART A & B / Product Type: Medicare /      In time: 1:00 Out time: 2:05   Total Treatment Time: 55     BCBS/Medicare Time Tracking (below)   Total Timed Codes (min):  30 1:1 Treatment Time:  30     TREATMENT AREA =  Pain in left foot [M79.672]    SUBJECTIVE  Pain Level (on 0 to 10 scale):  0  / 10   Medication Changes/New allergies or changes in medical history, any new surgeries or procedures? NO    If yes, update Summary List   Subjective Functional Status/Changes:  []  No changes reported     Pt reports no pain, but has been having consistent swelling in L foot/ankle. Her new shoes have been making walking easier, still suing [facility] when out of home - for balance.             Modalities Rationale:     decrease inflammation and decrease pain to improve patient's ability to increase walking tolerance   min [] Estim, type/location:                                      []  att     []  unatt     []  w/US     []  w/ice    []  w/heat    min []  Mechanical Traction: type/lbs                   []  pro   []  sup   []  int   []  cont    []  before manual    []  after manual    min []  Ultrasound, settings/location:      min []  Iontophoresis w/ dexamethasone, location:                                               []  take home patch       []  in clinic   10 min [x]  Ice     []  Heat    location/position: L foot in supine with elevation     min []  Vasopneumatic Device, press/temp:     min []  Other:    [] Skin assessment post-treatment (if applicable):    []  intact    []  redness- no adverse reaction     []redness  adverse reaction:        45(30) min Therapeutic Exercise:  [x]  See flow sheet   Rationale:      increase ROM, increase strength, improve coordination, improve balance and increase proprioception to improve the patients ability to increase standing/walking tolerance     Billed With/As:   [] TE   [] TA   [] Neuro   [] Self Care Patient Education: [x] Review HEP    [] Progressed/Changed HEP based on:   [] positioning   [] body mechanics   [] transfers   [] heat/ice application    [] other:      Other Objective/Functional Measures: Added standing gastroc sttrech, and ABCs with L ankle AROM     Post Treatment Pain Level (on 0 to 10) scale:   0  / 10     ASSESSMENT  Assessment/Changes in Function:     Good tolerance to new exercises. Swelling persists in ankle, but kennedy snot limit her functionally. []  See Progress Note/Recertification   Patient will continue to benefit from skilled PT services to modify and progress therapeutic interventions, address functional mobility deficits, address ROM deficits, address strength deficits, analyze and address soft tissue restrictions, analyze and cue movement patterns, analyze and modify body mechanics/ergonomics, assess and modify postural abnormalities and address imbalance/dizziness to attain remaining goals. Progress toward goals / Updated goals:    Making gradual progress with strength and sagittal plane flexibility.      PLAN  [x]  Upgrade activities as tolerated YES Continue plan of care   []  Discharge due to :    []  Other:      Therapist: Clint Noel PT    Date: 1/15/2020 Time: 10:44 AM     Future Appointments   Date Time Provider Stacey Hernández   1/15/2020  1:00 PM Gideon Walker [facility] [place]   1/20/2020  2:00 PM [facility]   1/22/2020  1:00 PM Gideon Walker [facility] [place]   1/24/2020  2:00 PM [facility]   1/27/2020  2:30 PM Gideon Walker, [facility] [place]   1/29/2020  1:00 PM Gideon Walker [facility] [place]   2/3/2020  2:00 PM [facility]   2/5/2020  1:00 PM Gideon Walker [facility]   2/10/2020  2:00 PM [facility]   2/12/2020  2:00 PM Desire Lombardo, PT [facility] [place]

## 2020-01-20 ENCOUNTER — HOSPITAL ENCOUNTER (OUTPATIENT)
Dept: PHYSICAL THERAPY | Age: 80
Discharge: HOME | End: 2020-01-20
Payer: MEDICARE

## 2020-01-20 PROCEDURE — 97110 THERAPEUTIC EXERCISES: CPT

## 2020-01-20 NOTE — PROGRESS NOTES
PHYSICAL THERAPY - DAILY TREATMENT NOTE    Patient Name: Carlyn Cintron        Date: 2020  : 1940   YES Patient  Verified  Visit #:     Insurance: Payor: Harpreet Snow / Plan: [place] MEDICARE PART A & B / Product Type: Medicare /      In time: 2:00 P Out time: 2:55 P   Total Treatment Time: 50     BCBS/Medicare Time Tracking (below)   Total Timed Codes (min):  40 1:1 Treatment Time:  40     TREATMENT AREA =  Pain in left foot [M79.962]    SUBJECTIVE  Pain Level (on 0 to 10 scale):  5  / 10   Medication Changes/New allergies or changes in medical history, any new surgeries or procedures? NO    If yes, update Summary List   Subjective Functional Status/Changes:  []  No changes reported     Patient reports that she is having some pain, but not much. Patient reports having a little soreness after last visit. Modalities Rationale:     decrease edema, decrease inflammation and decrease pain to improve patient's ability to ambulate with full ROM. min [] Estim, type/location:                                      []  att     []  unatt     []  w/US     []  w/ice    []  w/heat    min []  Mechanical Traction: type/lbs                   []  pro   []  sup   []  int   []  cont    []  before manual    []  after manual    min []  Ultrasound, settings/location:      min []  Iontophoresis w/ dexamethasone, location:                                               []  take home patch       []  in clinic   10 min [x]  Ice     []  Heat    location/position: Supine over L ankle    min []  Vasopneumatic Device, press/temp:     min []  Other:    [x] Skin assessment post-treatment (if applicable):    [x]  intact    [x]  redness- no adverse reaction     []redness  adverse reaction:        40 min Therapeutic Exercise:  [x]  See flow sheet   Rationale:      increase ROM, increase strength and improve balance to improve the patients ability to perform ADLs.      Billed With/As:   [] TE   [] TA   [] Neuro   [] Self Care Patient Education: [x] Review HEP    [] Progressed/Changed HEP based on:   [] positioning   [] body mechanics   [] transfers   [] heat/ice application    [] other:      Other Objective/Functional Measures:    Increased reps with theraband exercises, SLS, and standing HR. Added step-ups and side steps. Post Treatment Pain Level (on 0 to 10) scale:   0  / 10     ASSESSMENT  Assessment/Changes in Function:     Good tolerance to progression and addition of new strengthening exercises today. No c/o pain throughout visit. []  See Progress Note/Recertification   Patient will continue to benefit from skilled PT services to modify and progress therapeutic interventions, address functional mobility deficits, address ROM deficits, address strength deficits, analyze and address soft tissue restrictions, analyze and cue movement patterns, analyze and modify body mechanics/ergonomics, assess and modify postural abnormalities and instruct in home and community integration to attain remaining goals.    Progress toward goals / Updated goals:    Progressing toward STG 2 & 3 and LTG 2 & 3      PLAN  []  Upgrade activities as tolerated YES Continue plan of care   []  Discharge due to :    []  Other:      Therapist: Maria Del Rosario Robins    Date: 1/20/2020 Time: 2:24 PM     Future Appointments   Date Time Provider Stacey Hernández   1/22/2020  1:00 PM Ary Lynn [facility] [place]   1/24/2020  2:00 PM [facility] [place]   1/27/2020  2:30 PM Adarsh Eaton PT [facility]   1/29/2020  1:00 PM Adarsh Eaton PT [facility] [place]   2/3/2020  2:00 PM [facility]   2/5/2020  1:00 PM Adarsh Eaton PT [facility] [place]   2/10/2020  2:00 PM [facility]   2/12/2020  2:00 PM Alexis Lombardo, PT [facility] [place]

## 2020-01-22 ENCOUNTER — HOSPITAL ENCOUNTER (OUTPATIENT)
Dept: PHYSICAL THERAPY | Age: 80
Discharge: HOME | End: 2020-01-22
Payer: MEDICARE

## 2020-01-22 PROCEDURE — 97110 THERAPEUTIC EXERCISES: CPT | Performed by: PHYSICAL THERAPIST

## 2020-01-22 NOTE — PROGRESS NOTES
PHYSICAL THERAPY - DAILY TREATMENT NOTE    Patient Name: Amador Knight        Date: 2020  : 1940   YES Patient  Verified  Visit #:     Insurance: Payor: Dianne Awan / Plan: [place] MEDICARE PART A & B / Product Type: Medicare /      In time: 12:55 Out time: 1:50   Total Treatment Time: 55     BCBS/Medicare Time Tracking (below)   Total Timed Codes (min):  40 1:1 Treatment Time:  40     TREATMENT AREA =  Pain in left foot [M79.672]    SUBJECTIVE  Pain Level (on 0 to 10 scale):  3  / 10   Medication Changes/New allergies or changes in medical history, any new surgeries or procedures? NO    If yes, update Summary List   Subjective Functional Status/Changes:  []  No changes reported     Pt reports shoes have really helped and she is awaiting the shoe store to call when her custom sneakers come in.           Modalities Rationale:     decrease inflammation and decrease pain to improve patient's ability to increase walking tolerance   min [] Estim, type/location:                                      []  att     []  unatt     []  w/US     []  w/ice    []  w/heat    min []  Mechanical Traction: type/lbs                   []  pro   []  sup   []  int   []  cont    []  before manual    []  after manual    min []  Ultrasound, settings/location:      min []  Iontophoresis w/ dexamethasone, location:                                               []  take home patch       []  in clinic   10 min [x]  Ice     []  Heat    location/position: L foot in supine with elevation     min []  Vasopneumatic Device, press/temp:     min []  Other:    [] Skin assessment post-treatment (if applicable):    []  intact    []  redness- no adverse reaction     []redness  adverse reaction:        45(40) min Therapeutic Exercise:  [x]  See flow sheet   Rationale:      increase ROM, increase strength, improve coordination, improve balance and increase proprioception to improve the patients ability to increase standing/walking tolerance     Billed With/As:   [] TE   [] TA   [] Neuro   [] Self Care Patient Education: [x] Review HEP    [] Progressed/Changed HEP based on:   [] positioning   [] body mechanics   [] transfers   [] heat/ice application    [] other:      Other Objective/Functional Measures:    Pt requires S for walking without SPC, and she looks at floor to try and imporve stability     Post Treatment Pain Level (on 0 to 10) scale:   0  / 10     ASSESSMENT  Assessment/Changes in Function:     Good tolerance to calf stretching in standing and progressive strengthening. []  See Progress Note/Recertification   Patient will continue to benefit from skilled PT services to modify and progress therapeutic interventions, address functional mobility deficits, address ROM deficits, address strength deficits, analyze and address soft tissue restrictions, analyze and cue movement patterns, analyze and modify body mechanics/ergonomics, assess and modify postural abnormalities and address imbalance/dizziness to attain remaining goals. Progress toward goals / Updated goals:    Good WB tolerance and strengthening without pain. She does have some decreased stability when walking without AD, and will continue to [facility] when out in community.      PLAN  [x]  Upgrade activities as tolerated YES Continue plan of care   []  Discharge due to :    []  Other:      Therapist: Tariq Villatoro PT    Date: 1/22/2020 Time: 10:44 AM     Future Appointments   Date Time Provider Stacey Hernández   1/22/2020  1:00 PM Uziel Lao PT [facility] [place]   1/24/2020  2:00 PM [facility]   1/27/2020  2:30 PM Uziel Lao PT [facility] [place]   1/29/2020  1:00 PM Uziel Lao PT [facility] [place]   2/3/2020  2:00 PM [facility]   2/5/2020  1:00 PM Uziel Lao PT [facility] [place]   2/10/2020  2:00 PM [facility]   2/12/2020  2:00 PM Parker Lombardo, PT [facility] [place]

## 2020-01-24 ENCOUNTER — HOSPITAL ENCOUNTER (OUTPATIENT)
Dept: PHYSICAL THERAPY | Age: 80
Discharge: HOME | End: 2020-01-24
Payer: MEDICARE

## 2020-01-24 PROCEDURE — 97110 THERAPEUTIC EXERCISES: CPT

## 2020-01-24 NOTE — PROGRESS NOTES
PHYSICAL THERAPY - DAILY TREATMENT NOTE    Patient Name: Luli Lindsay        Date: 2020  : 1940   YES Patient  Verified  Visit #:     Insurance: Payor: Dave Signs / Plan: [place] MEDICARE PART A & B / Product Type: Medicare /      In time: 2:00 P Out time: 3:00 P   Total Treatment Time: 60     BCBS/Medicare Time Tracking (below)   Total Timed Codes (min):  50 1:1 Treatment Time:  25     TREATMENT AREA =  Pain in left foot [M79.672]    SUBJECTIVE  Pain Level (on 0 to 10 scale):  0  / 10   Medication Changes/New allergies or changes in medical history, any new surgeries or procedures? NO    If yes, update Summary List   Subjective Functional Status/Changes:  []  No changes reported     No c/o of pain today. Patient reports feeling some soreness after last visit. Modalities Rationale:     decrease edema, decrease inflammation and decrease pain to improve patient's ability to ambulate with reduced pain. min [] Estim, type/location:                                      []  att     []  unatt     []  w/US     []  w/ice    []  w/heat    min []  Mechanical Traction: type/lbs                   []  pro   []  sup   []  int   []  cont    []  before manual    []  after manual    min []  Ultrasound, settings/location:      min []  Iontophoresis w/ dexamethasone, location:                                               []  take home patch       []  in clinic   10 min [x]  Ice     []  Heat    location/position: Supine with LEs elevated over L ankle. min []  Vasopneumatic Device, press/temp:     min []  Other:    [x] Skin assessment post-treatment (if applicable):    [x]  intact    [x]  redness- no adverse reaction     []redness  adverse reaction:        50/ 25 min Therapeutic Exercise:  [x]  See flow sheet   Rationale:      increase ROM, increase strength and improve balance to improve the patients ability to perform ADLs.      Billed With/As:   [] TE   [] TA   [] Neuro   [] Self Care Patient Education: [x] Review HEP    [] Progressed/Changed HEP based on:   [] positioning   [] body mechanics   [] transfers   [] heat/ice application    [] other:      Other Objective/Functional Measures: Added lateral step-downs. Increased hold time for SLS. Increased reps for side steps and step-ups. Post Treatment Pain Level (on 0 to 10) scale:   0  / 10     ASSESSMENT  Assessment/Changes in Function:     Good tolerance with progression of exercises today. Attempted SLS with single UE support, but was too difficult to maintain so continued with double support. Continue to progress as able with strengthening. []  See Progress Note/Recertification   Patient will continue to benefit from skilled PT services to modify and progress therapeutic interventions, address functional mobility deficits, address ROM deficits, address strength deficits, analyze and address soft tissue restrictions, analyze and cue movement patterns, analyze and modify body mechanics/ergonomics, assess and modify postural abnormalities and instruct in home and community integration to attain remaining goals. Progress toward goals / Updated goals:    Progressing toward STGs 2&3 and LTG 2.      PLAN  [x]  Upgrade activities as tolerated YES Continue plan of care   []  Discharge due to :    []  Other:      Therapist: Anjum Leggett    Date: 1/24/2020 Time: 4:26 PM     Future Appointments   Date Time Provider Stacey Hernández   1/27/2020  2:30 PM Maira Rodrigues [facility] [place]   1/29/2020  1:00 PM Lauren Shankar [facility] [place]   2/3/2020  2:00 PM [facility] [place]   2/5/2020  1:00 PM Lauren Shankar [facility] [place]   2/10/2020  2:00 PM Kash Sauceda [facility]   2/12/2020  2:00 PM Karmen Lombardo, [facility] [place]

## 2020-01-27 ENCOUNTER — HOSPITAL ENCOUNTER (OUTPATIENT)
Dept: PHYSICAL THERAPY | Age: 80
Discharge: HOME | End: 2020-01-27
Payer: MEDICARE

## 2020-01-27 PROCEDURE — 97110 THERAPEUTIC EXERCISES: CPT | Performed by: PHYSICAL THERAPIST

## 2020-01-27 NOTE — PROGRESS NOTES
PHYSICAL THERAPY - DAILY TREATMENT NOTE    Patient Name: Denis Harris        Date: 2020  : 1940   YES Patient  Verified  Visit #:     Insurance: Payor: Eve Beans / Plan: [place] MEDICARE PART A & B / Product Type: Medicare /      In time: 2:25 Out time: 3:15   Total Treatment Time: 50     BCBS/Medicare Time Tracking (below)   Total Timed Codes (min):  40 1:1 Treatment Time:  40     TREATMENT AREA =  Pain in left foot [M79.062]    SUBJECTIVE  Pain Level (on 0 to 10 scale):  0  / 10   Medication Changes/New allergies or changes in medical history, any new surgeries or procedures? NO    If yes, update Summary List   Subjective Functional Status/Changes:  []  No changes reported     Pt reports having no pain in ankle. She is wearing her new shoes regularly, and uses SPC when out of home.           Modalities Rationale:     decrease inflammation and decrease pain to improve patient's ability to increase walking tolerance   min [] Estim, type/location:                                      []  att     []  unatt     []  w/US     []  w/ice    []  w/heat    min []  Mechanical Traction: type/lbs                   []  pro   []  sup   []  int   []  cont    []  before manual    []  after manual    min []  Ultrasound, settings/location:      min []  Iontophoresis w/ dexamethasone, location:                                               []  take home patch       []  in clinic   10 min [x]  Ice     []  Heat    location/position: L foot in supine with elevation     min []  Vasopneumatic Device, press/temp:     min []  Other:    [] Skin assessment post-treatment (if applicable):    []  intact    []  redness- no adverse reaction     []redness  adverse reaction:        40 min Therapeutic Exercise:  [x]  See flow sheet   Rationale:      increase ROM, increase strength, improve coordination, improve balance and increase proprioception to improve the patients ability to increase standing/walking tolerance Billed With/As:   [] TE   [] TA   [] Neuro   [] Self Care Patient Education: [x] Review HEP    [] Progressed/Changed HEP based on:   [] positioning   [] body mechanics   [] transfers   [] heat/ice application    [] other:      Other Objective/Functional Measures:    Practiced wlaking outside on various surfaces, slopes, grass, etc with SPC     Post Treatment Pain Level (on 0 to 10) scale:   0  / 10     ASSESSMENT  Assessment/Changes in Function:     Pt did not have any instability with varying terrain, and used staggered stance to assist with stability without VCs.     []  See Progress Note/Recertification   Patient will continue to benefit from skilled PT services to modify and progress therapeutic interventions, address functional mobility deficits, address ROM deficits, address strength deficits, analyze and address soft tissue restrictions, analyze and cue movement patterns, analyze and modify body mechanics/ergonomics, assess and modify postural abnormalities and address imbalance/dizziness to attain remaining goals.    Progress toward goals / Updated goals:    Progressing well toward all LTGs     PLAN  [x]  Upgrade activities as tolerated YES Continue plan of care   []  Discharge due to :    []  Other:      Therapist: Rush Muñoz PT    Date: 1/27/2020 Time: 10:44 AM     Future Appointments   Date Time Provider Stacey Hernández   1/27/2020  2:30 PM Janice Orellana PT [facility] [place]   1/29/2020  1:00 PM Janice Orellana PT [facility] [place]   2/3/2020  [facility] [place]   2/5/2020  1:00 PM Janice Orellana PT [facility] [place]   [facility]2020  [facility]   2/12/2020  2:00 PM Julissa Lombardo, PT [facility] [place]

## 2020-01-29 ENCOUNTER — HOSPITAL ENCOUNTER (OUTPATIENT)
Dept: PHYSICAL THERAPY | Age: 80
Discharge: HOME | End: 2020-01-29
Payer: MEDICARE

## 2020-01-29 PROCEDURE — 97110 THERAPEUTIC EXERCISES: CPT | Performed by: PHYSICAL THERAPIST

## 2020-01-29 NOTE — PROGRESS NOTES
PHYSICAL THERAPY - DAILY TREATMENT NOTE    Patient Name: Mack Baumgarten        Date: 2020  : 1940   YES Patient  Verified  Visit #:     Insurance: Payor: Ag Carroll / Plan: [place] MEDICARE PART A & B / Product Type: Medicare /      In time: 1:00 Out time: 2:00   Total Treatment Time: 55     BCBS/Medicare Time Tracking (below)   Total Timed Codes (min):  40 1:1 Treatment Time:  40     TREATMENT AREA =  Pain in left foot [M79.432]    SUBJECTIVE  Pain Level (on 0 to 10 scale):  0  / 10   Medication Changes/New allergies or changes in medical history, any new surgeries or procedures? NO    If yes, update Summary List   Subjective Functional Status/Changes:  []  No changes reported     Pt reports no soreness in ankle and her swelling is improving. She is wearing shoes at all times and walking w SPC.           Modalities Rationale:     decrease inflammation and decrease pain to improve patient's ability to increase walking tolerance   min [] Estim, type/location:                                      []  att     []  unatt     []  w/US     []  w/ice    []  w/heat    min []  Mechanical Traction: type/lbs                   []  pro   []  sup   []  int   []  cont    []  before manual    []  after manual    min []  Ultrasound, settings/location:      min []  Iontophoresis w/ dexamethasone, location:                                               []  take home patch       []  in clinic   10 min [x]  Ice     []  Heat    location/position: L foot in supine with elevation     min []  Vasopneumatic Device, press/temp:     min []  Other:    [] Skin assessment post-treatment (if applicable):    []  intact    []  redness- no adverse reaction     []redness  adverse reaction:        45(40) min Therapeutic Exercise:  [x]  See flow sheet   Rationale:      increase ROM, increase strength, improve coordination, improve balance and increase proprioception to improve the patients ability to increase standing/walking tolerance     Billed With/As:   [] TE   [] TA   [] Neuro   [] Self Care Patient Education: [x] Review HEP    [] Progressed/Changed HEP based on:   [] positioning   [] body mechanics   [] transfers   [] heat/ice application    [] other:      Other Objective/Functional Measures:    FOTO - 54     Post Treatment Pain Level (on 0 to 10) scale:   0  / 10     ASSESSMENT  Assessment/Changes in Function:     Progressing well toward all goals and will DC with HEP. [x]  See DC Note      Progress toward goals / Updated goals:    See DC Note     PLAN  []  Upgrade activities as tolerated    [x]  Discharge due to : Met or progressing toward all goals.    []  Other:      Therapist: Perla Singletary PT    Date: 1/29/2020 Time: 10:44 AM     Future Appointments   Date Time Provider tSacey Hernández   1/29/2020  1:00 PM Morteza Villarreal [facility] [place]   2/3/2020  2:00 PM [facility] [place]   2/5/2020  1:00 PM Tunde Regalado, LE[place] [facility] [place]   2/10/2020  2:00 PM [facility] [place]   2/12/2020  2:00 PM Florin Lombardo, PT [facility] [place]

## 2020-01-30 NOTE — PROGRESS NOTES
[address]  [facility]  [facility]  Ishan Huggins [address], [address], [address]  Phone: [phone]  Fax: [phone] FOR PHYSICAL THERAPY          Patient Name: Lalito Amaya : 1940   Treatment/Medical Diagnosis: Pain in left foot [M79.672]   Onset Date: 10/2/19    Referral Source: Kenneth Hassan MD [facility]): 19   Prior Hospitalization: See Medical History Provider #: [id number]   Prior Level of Function: Painful, limited ambulation with use of FWW/SPC due to left foot pain. I with all ADLs and driving    Comorbidities: H/o breast cancer, R shoulder shoulder, Bilateral knee and hip replacements, hearing impairment, visual impairment, HTN, arthritis, thyroid dysfunction. Medications: Verified on Patient Summary List   Vis[facility] from [facility]: 9 Missed Visits: 0     Goal/Measure of Progress Goal Met? 1. Patient will be I and compliant with a progressive, high level HEP in order to maintain gains made in physical therapy   Status at last Eval: - Current Status: Met yes   2. Patient will increase strength of the L ankle to 4/5 in order to allow for ease with stair negotiation. Status at last Eval: 3/5 Current Status: 4/5 yes   3. Patient will report pain to less than 2/10 in order to allow for care of grandchildren. Status at last Eval: 6/10 Current Status: 0/10 yes   4. Pt to increase FOTO score from 37 to >/= 48. Status at last Eval: 37 Current Status: 54 yes     Key Functional Changes/Progress: Pt was last seen on 19, and denied having any L ankle pain. She has been wearing new, supportive shoes, allowing her to walk without pain. She notes still having some sense of decreased balance, but using SPC allows her to walk on various terrain and sloped surfaces without LOB. Ms French Mckeon is independent with HEP and is progressing toward all LTGs. Will DC from PT. Assessments/Recommendations: Discontinue therapy. Progressing towards or have reached established goals. If you have any questions/comments please contact us directly at [phone]. Thank you for allowing us to assist in the care of your patient. Therapist Signature:  Adrien Willis PT Date: 1/29/20   Reporting Period: 12/30/19 - 1/29/20 Time: 9:46 AM

## 2020-02-03 ENCOUNTER — APPOINTMENT (OUTPATIENT)
Dept: PHYSICAL THERAPY | Age: 80
End: 2020-02-03

## 2020-02-05 ENCOUNTER — APPOINTMENT (OUTPATIENT)
Dept: PHYSICAL THERAPY | Age: 80
End: 2020-02-05

## 2020-02-10 ENCOUNTER — APPOINTMENT (OUTPATIENT)
Dept: PHYSICAL THERAPY | Age: 80
End: 2020-02-10

## 2020-02-12 ENCOUNTER — APPOINTMENT (OUTPATIENT)
Dept: PHYSICAL THERAPY | Age: 80
End: 2020-02-12

## 2024-12-18 NOTE — PROGRESS NOTES
----- Message from Dr. Ashley Roman DO sent at 12/9/2024  4:36 PM EST -----  Please inform that mammogram was negative, repeat in one year.    PHYSICAL THERAPY - DAILY TREATMENT NOTE Patient Name: Phi Calvillo        Date: 10/17/2018 : 1940   YES Patient  Verified Visit #:     Insurance: Payor: Doni Carpenter / Plan: Kelvin Alvarenga / Product Type: Medicare / In time: 1:55 Out time: 2:45 Total Treatment Time: 50 Medicare Time Tracking (below) Total Timed Codes (min):  40 1:1 Treatment Time:  40 TREATMENT AREA = H/O shoulder surgery [Z98.890] SUBJECTIVE Pain Level (on 0 to 10 scale):  0  / 10 Medication Changes/New allergies or changes in medical history, any new surgeries or procedures? NO    If yes, update Summary List  
Subjective Functional Status/Changes:  []  No changes reported Pt reports feeding  a large meal today, and was nilesh to sit for the entire time. OBJECTIVE Modalities Rationale:     decrease edema, decrease inflammation and decrease pain to improve patient's ability to regain shoulder AROM strength/endurance with reaching 
 min [] Estim, type/location:   
                                 []  att     []  unatt     []  w/US     []  w/ice    []  w/heat 
 min []  Mechanical Traction: type/lbs   
               []  pro   []  sup   []  int   []  cont    []  before manual    []  after manual  
 min []  Ultrasound, settings/location:    
 min []  Iontophoresis w/ dexamethasone, location:   
                                           []  take home patch       []  in clinic  
10 min [x]  Ice     []  Heat    location/position: R Shoulder - seated after treatment  
 min []  Vasopneumatic Device, press/temp:   
 min []  Other:   
[] Skin assessment post-treatment (if applicable):   
[]  intact    []  redness- no adverse reaction    
[]redness  adverse reaction:     
40 min Therapeutic Exercise:  [x]  See flow sheet Rationale:      increase ROM, increase strength and improve coordination to improve the patients ability to increase shoulder reach  
 min Therapeutic Activity: min Neuromuscular Re-ed:   
 
 min Gait Training:   
 min Patient Education:  Louie Spatz []  Progressed/Changed HEP based on: Other Objective/Functional Measures: 
 
Minimal VCs needed to perform therex correctly. Post Treatment Pain Level (on 0 to 10) scale:   0  / 10 ASSESSMENT Assessment/Changes in Function: She still fatigues most quickly with standing bilateral shoulder elevation (2x10), but she has been able to increase sets/reps with all other exercises to 2x15.   
[]  See Progress Note/Recertification Patient will continue to benefit from skilled PT services to modify and progress therapeutic interventions, address functional mobility deficits, address ROM deficits, address strength deficits, analyze and address soft tissue restrictions, analyze and cue movement patterns, analyze and modify body mechanics/ergonomics and assess and modify postural abnormalities to attain remaining goals. Progress toward goals / Updated goals: Will continue to progress right shoulder elevation/strength/endurance to achieve LTGs. PLAN [x]  Upgrade activities as tolerated YES Continue plan of care  
[]  Discharge due to :   
[]  Other:   
 
Therapist: Leona Harper PT Date: 10/17/2018 Time: 10:54 AM  
 
Future Appointments Date Time Provider Stacey Hernández 10/17/2018  2:00 PM Ozzie Ng PT [facility]  
10/19/2018  2:00 PM Palmira Callejas [facility]  
10/22/2018  3:30 PM Palmira Callejas [facility] [place]  
10/24/2018  2:00 PM Ozzie Ng PT [facility]  
10/29/2018  4:00 PM Ozzie Ng PT [facility]  
10/31/2018  2:30 PM Mami Lombardo, PT [facility]